# Patient Record
Sex: MALE | Race: WHITE | NOT HISPANIC OR LATINO | Employment: FULL TIME | URBAN - METROPOLITAN AREA
[De-identification: names, ages, dates, MRNs, and addresses within clinical notes are randomized per-mention and may not be internally consistent; named-entity substitution may affect disease eponyms.]

---

## 2018-09-04 ENCOUNTER — OFFICE VISIT (OUTPATIENT)
Dept: URGENT CARE | Facility: CLINIC | Age: 33
End: 2018-09-04

## 2018-09-04 VITALS
HEART RATE: 67 BPM | WEIGHT: 155 LBS | BODY MASS INDEX: 19.27 KG/M2 | RESPIRATION RATE: 16 BRPM | DIASTOLIC BLOOD PRESSURE: 72 MMHG | SYSTOLIC BLOOD PRESSURE: 133 MMHG | OXYGEN SATURATION: 98 % | HEIGHT: 75 IN | TEMPERATURE: 99 F

## 2018-09-04 DIAGNOSIS — Z02.89 ENCOUNTER FOR PHYSICAL EXAMINATION RELATED TO EMPLOYMENT: Primary | ICD-10-CM

## 2018-09-04 PROCEDURE — 99499 UNLISTED E&M SERVICE: CPT | Mod: S$GLB,,, | Performed by: FAMILY MEDICINE

## 2018-09-04 NOTE — PROGRESS NOTES
"Subjective:       Patient ID: Massimo Chanel is a 33 y.o. male.    Vitals:  height is 6' 3" (1.905 m) and weight is 70.3 kg (155 lb). His temperature is 99.3 °F (37.4 °C). His blood pressure is 133/72 and his pulse is 67. His respiration is 16 and oxygen saturation is 98%.     Chief Complaint: Employment Physical (pedicab)    Patient is here for a pedicab physical, does not wear any corrective lens, Patient states he could use a tetnus shot       Review of Systems   Constitution: Negative for chills and fever.   HENT: Negative for sore throat.    Eyes: Negative for blurred vision.   Cardiovascular: Negative for chest pain.   Respiratory: Negative for shortness of breath.    Skin: Negative for rash.   Musculoskeletal: Negative for back pain and joint pain.   Gastrointestinal: Negative for abdominal pain, diarrhea, nausea and vomiting.   Neurological: Negative for headaches.   Psychiatric/Behavioral: The patient is not nervous/anxious.    All other systems reviewed and are negative.      Objective:      Physical Exam   Constitutional: He is oriented to person, place, and time. He appears well-developed and well-nourished. He is cooperative.  Non-toxic appearance. He does not appear ill. No distress.   HENT:   Head: Normocephalic and atraumatic.   Right Ear: Hearing, tympanic membrane, external ear and ear canal normal.   Left Ear: Hearing, tympanic membrane, external ear and ear canal normal.   Nose: Nose normal. No mucosal edema, rhinorrhea or nasal deformity. No epistaxis. Right sinus exhibits no maxillary sinus tenderness and no frontal sinus tenderness. Left sinus exhibits no maxillary sinus tenderness and no frontal sinus tenderness.   Mouth/Throat: Uvula is midline, oropharynx is clear and moist and mucous membranes are normal. No trismus in the jaw. Normal dentition. No uvula swelling. No posterior oropharyngeal erythema.   Eyes: Conjunctivae and lids are normal. Right eye exhibits no discharge. Left " eye exhibits no discharge. No scleral icterus.   Sclera clear bilat   Neck: Trachea normal, normal range of motion, full passive range of motion without pain and phonation normal. Neck supple.   Cardiovascular: Normal rate, regular rhythm, normal heart sounds, intact distal pulses and normal pulses.   Pulmonary/Chest: Effort normal and breath sounds normal. No respiratory distress.   Abdominal: Soft. Normal appearance and bowel sounds are normal. He exhibits no distension, no pulsatile midline mass and no mass. There is no tenderness.   Musculoskeletal: Normal range of motion. He exhibits no edema or deformity.   Neurological: He is alert and oriented to person, place, and time. He exhibits normal muscle tone. Coordination normal.   Skin: Skin is warm, dry and intact. He is not diaphoretic. No pallor.   Psychiatric: He has a normal mood and affect. His speech is normal and behavior is normal. Judgment and thought content normal. Cognition and memory are normal.   Nursing note and vitals reviewed.      Assessment:       1. Encounter for physical examination related to employment        Plan:         Encounter for physical examination related to employment       F/U as needed  Clear for work

## 2018-09-28 ENCOUNTER — OFFICE VISIT (OUTPATIENT)
Dept: URGENT CARE | Facility: CLINIC | Age: 33
End: 2018-09-28

## 2018-09-28 VITALS
OXYGEN SATURATION: 99 % | HEIGHT: 75 IN | WEIGHT: 155 LBS | TEMPERATURE: 98 F | DIASTOLIC BLOOD PRESSURE: 62 MMHG | SYSTOLIC BLOOD PRESSURE: 111 MMHG | RESPIRATION RATE: 18 BRPM | BODY MASS INDEX: 19.27 KG/M2 | HEART RATE: 60 BPM

## 2018-09-28 DIAGNOSIS — M54.41 ACUTE RIGHT-SIDED LOW BACK PAIN WITH RIGHT-SIDED SCIATICA: Primary | ICD-10-CM

## 2018-09-28 PROCEDURE — 99214 OFFICE O/P EST MOD 30 MIN: CPT | Mod: 25,S$GLB,, | Performed by: NURSE PRACTITIONER

## 2018-09-28 PROCEDURE — 96372 THER/PROPH/DIAG INJ SC/IM: CPT | Mod: S$GLB,,, | Performed by: NURSE PRACTITIONER

## 2018-09-28 RX ORDER — METHYLPREDNISOLONE 4 MG/1
TABLET ORAL
Qty: 1 PACKAGE | Refills: 0 | Status: SHIPPED | OUTPATIENT
Start: 2018-09-28 | End: 2022-06-15

## 2018-09-28 RX ORDER — KETOROLAC TROMETHAMINE 30 MG/ML
30 INJECTION, SOLUTION INTRAMUSCULAR; INTRAVENOUS
Status: COMPLETED | OUTPATIENT
Start: 2018-09-28 | End: 2018-09-28

## 2018-09-28 RX ORDER — BETAMETHASONE SODIUM PHOSPHATE AND BETAMETHASONE ACETATE 3; 3 MG/ML; MG/ML
9 INJECTION, SUSPENSION INTRA-ARTICULAR; INTRALESIONAL; INTRAMUSCULAR; SOFT TISSUE
Status: COMPLETED | OUTPATIENT
Start: 2018-09-28 | End: 2018-09-28

## 2018-09-28 RX ORDER — METHOCARBAMOL 750 MG/1
750 TABLET, FILM COATED ORAL 4 TIMES DAILY PRN
Qty: 40 TABLET | Refills: 0 | Status: SHIPPED | OUTPATIENT
Start: 2018-09-28 | End: 2018-10-08

## 2018-09-28 RX ADMIN — BETAMETHASONE SODIUM PHOSPHATE AND BETAMETHASONE ACETATE 9 MG: 3; 3 INJECTION, SUSPENSION INTRA-ARTICULAR; INTRALESIONAL; INTRAMUSCULAR; SOFT TISSUE at 05:09

## 2018-09-28 RX ADMIN — KETOROLAC TROMETHAMINE 30 MG: 30 INJECTION, SOLUTION INTRAMUSCULAR; INTRAVENOUS at 05:09

## 2018-09-28 NOTE — PROGRESS NOTES
"Subjective:       Patient ID: Massimo Chanel is a 33 y.o. male.    Vitals:  height is 6' 3" (1.905 m) and weight is 70.3 kg (155 lb). His oral temperature is 97.5 °F (36.4 °C). His blood pressure is 111/62 and his pulse is 60. His respiration is 18 and oxygen saturation is 99%.     Chief Complaint: Back Pain    Patient states he been having back pain for 2 weeks. Patient states he was moving furniture today and his lower back began to hurt.  Right sided back pain radiating down right leg . Denies previous back injury.   Denies loss of bowel/bladder control. Denies .   Denies numbness or tingling to legs.         Back Pain   This is a new problem. The current episode started 1 to 4 weeks ago. The problem occurs constantly. The problem has been gradually worsening since onset. Pain location: lower back. The quality of the pain is described as shooting and stabbing. The pain does not radiate. The pain is at a severity of 8/10. The pain is severe. The symptoms are aggravated by standing and sitting. Pertinent negatives include no abdominal pain, bladder incontinence, bowel incontinence, dysuria or numbness. He has tried NSAIDs for the symptoms. The treatment provided no relief.     Review of Systems   Constitution: Negative for malaise/fatigue.   Skin: Negative for rash.   Musculoskeletal: Positive for back pain, muscle cramps, muscle weakness and stiffness.   Gastrointestinal: Negative for abdominal pain and bowel incontinence.   Genitourinary: Negative for bladder incontinence, dysuria, hematuria and urgency.   Neurological: Negative for disturbances in coordination and numbness.       Objective:      Physical Exam   Constitutional: He is oriented to person, place, and time. Vital signs are normal. He appears well-developed and well-nourished. He is active and cooperative.  Non-toxic appearance. He does not have a sickly appearance. He does not appear ill. No distress.   HENT:   Head: Normocephalic and " atraumatic.   Nose: Nose normal.   Mouth/Throat: Oropharynx is clear and moist and mucous membranes are normal.   Eyes: Conjunctivae, EOM and lids are normal. Pupils are equal, round, and reactive to light.   Neck: Trachea normal, normal range of motion, full passive range of motion without pain and phonation normal. Neck supple.   Cardiovascular: Normal rate, regular rhythm, normal heart sounds, intact distal pulses and normal pulses.   Pulmonary/Chest: Effort normal and breath sounds normal.   Abdominal: Soft. Normal appearance and bowel sounds are normal. He exhibits no abdominal bruit, no pulsatile midline mass and no mass. There is no tenderness. There is no rigidity, no rebound, no guarding, no CVA tenderness, no tenderness at McBurney's point and negative Lin's sign.   Musculoskeletal: He exhibits no edema or deformity.        Lumbar back: He exhibits decreased range of motion, tenderness, pain and spasm. He exhibits no bony tenderness, no swelling, no edema, no deformity, no laceration and normal pulse.        Back:    Paraspinous muscles tender to palpation  + straight leg raise to right leg.   No spinal tenderness    Neurological: He is alert and oriented to person, place, and time. He has normal strength and normal reflexes. He displays no atrophy and no tremor. No cranial nerve deficit or sensory deficit. He exhibits normal muscle tone. He displays no seizure activity. Gait (antalgic ) abnormal. Coordination normal.   NV intact    Skin: Skin is warm, dry and intact. No rash noted. He is not diaphoretic.   Psychiatric: He has a normal mood and affect. His speech is normal and behavior is normal. Judgment and thought content normal. Cognition and memory are normal.   Nursing note and vitals reviewed.      Assessment:       1. Acute right-sided low back pain with right-sided sciatica        Plan:         Acute right-sided low back pain with right-sided sciatica  -     betamethasone acetate-betamethasone  sodium phosphate injection 9 mg; Inject 1.5 mLs (9 mg total) into the muscle one time.  -     ketorolac injection 30 mg; Inject 1 mL (30 mg total) into the muscle one time.  -     methylPREDNISolone (MEDROL, DON,) 4 mg tablet; use as directed  Dispense: 1 Package; Refill: 0  -     methocarbamol (ROBAXIN) 750 MG Tab; Take 1 tablet (750 mg total) by mouth 4 (four) times daily as needed.  Dispense: 40 tablet; Refill: 0      Patient Instructions   Aleve 2 tablets 2 times per day with food.   Robaxin is a muscle relaxant- may cause sedation do not take and drive.   Medrol dose don to start tomorrow.     Please drink plenty of fluids.  Please get plenty of rest.  Please return here or go to the Emergency Department for any concerns or worsening of condition.  If you were prescribed a narcotic medication, do not drive or operate heavy equipment or machinery while taking these medications.  If you were not prescribed an anti-inflammatory medication, and if you do not have any history of stomach/intestinal ulcers, or kidney disease, or are not taking a blood thinner such as Coumadin, Plavix, Pradaxa, Eloquis, or Xaralta for example, it is OK to take over the counter Ibuprofen or Advil or Motrin or Aleve as directed.  Do not take these medications on an empty stomach.  If you lose control of your bowel and/or bladder, please go to the nearest Emergency Department immediately.  If you lose sensation in between your legs by your genitalia and/or rectum, please go to the nearest Emergency Department immediately.  If you lose control or sensation of any extremity, please go to the nearest Emergency Department immediately.  Please follow up with your primary care doctor or specialist as needed.    If you  smoke, please stop smoking.        Back Pain (Acute or Chronic)    Back pain is one of the most common problems. The good news is that most people feel better in 1 to 2 weeks, and most of the rest in 1 to 2 months. Most people  can remain active.  People experience and describe pain differently; not everyone is the same.  · The pain can be sharp, stabbing, shooting, aching, cramping or burning.  · Movement, standing, bending, lifting, sitting, or walking may worsen pain.  · It can be localized to one spot or area, or it can be more generalized.  · It can spread or radiate upwards, to the front, or go down your arms or legs (sciatica).  · It can cause muscle spasm.  Most of the time, mechanical problems with the muscles or spine cause the pain. Mechanical problems are usually caused by an injury to the muscles or ligaments. While illness can cause back pain, it is usually not caused by a serious illness. Mechanical problems include:   · Physical activity such as sports, exercise, work, or normal activity  · Overexertion, lifting, pushing, pulling incorrectly or too aggressively  · Sudden twisting, bending, or stretching from an accident, or accidental movement  · Poor posture  · Stretching or moving wrong, without noticing pain at the time  · Poor coordination, lack of regular exercise (check with your doctor about this)  · Spinal disc disease or arthritis  · Stress  Pain can also be related to pregnancy, or illness like appendicitis, bladder or kidney infections, pelvic infections, and many other things.  Acute back pain usually gets better in 1 to 2 weeks. Back pain related to disk disease, arthritis in the spinal joints or spinal stenosis (narrowing of the spinal canal) can become chronic and last for months or years.  Unless you had a physical injury (for example, a car accident or fall) X-rays are usually not needed for the initial evaluation of back pain. If pain continues and does not respond to medical treatment, X-rays and other tests may be needed.  Home care  Try these home care recommendations:  · When in bed, try to find a position of comfort. A firm mattress is best. Try lying flat on your back with pillows under your knees.  You can also try lying on your side with your knees bent up towards your chest and a pillow between your knees.  · At first, do not try to stretch out the sore spots. If there is a strain, it is not like the good soreness you get after exercising without an injury. In this case, stretching may make it worse.  · Avoid prolong sitting, long car rides, or travel. This puts more stress on the lower back than standing or walking.  · During the first 24 to 72 hours after an acute injury or flare up of chronic back pain, apply an ice pack to the painful area for 20 minutes and then remove it for 20 minutes. Do this over a period of 60 to 90 minutes or several times a day. This will reduce swelling and pain. Wrap the ice pack in a thin towel or plastic to protect your skin.  · You can start with ice, then switch to heat. Heat (hot shower, hot bath, or heating pad) reduces pain and works well for muscle spasms. Heat can be applied to the painful area for 20 minutes then remove it for 20 minutes. Do this over a period of 60 to 90 minutes or several times a day. Do not sleep on a heating pad. It can lead to skin burns or tissue damage.  · You can alternate ice and heat therapy. Talk with your doctor about the best treatment for your back pain.  · Therapeutic massage can help relax the back muscles without stretching them.  · Be aware of safe lifting methods and do not lift anything without stretching first.  Medicines  Talk to your doctor before using medicine, especially if you have other medical problems or are taking other medicines.  · You may use over-the-counter medicine as directed on the bottle to control pain, unless another pain medicine was prescribed. If you have chronic conditions like diabetes, liver or kidney disease, stomach ulcers, or gastrointestinal bleeding, or are taking blood thinners, talk to your doctor before taking any medicine.  · Be careful if you are given a prescription medicines, narcotics, or  medicine for muscle spasms. They can cause drowsiness, affect your coordination, reflexes, and judgement. Do not drive or operate heavy machinery.  Follow-up care  Follow up with your healthcare provider, or as advised.   A radiologist will review any X-rays that were taken. Your provide will notify you of any new findings that may affect your care.  Call 911  Call emergency services if any of the following occur:  · Trouble breathing  · Confusion  · Very drowsy or trouble awakening  · Fainting or loss of consciousness  · Rapid or very slow heart rate  · Loss of bowel or bladder control  When to seek medical advice  Call your healthcare provider right away if any of these occur:   · Pain becomes worse or spreads to your legs  · Weakness or numbness in one or both legs  · Numbness in the groin or genital area  Date Last Reviewed: 7/1/2016  © 0825-2928 [employer]. [address] [place] [id number]. All rights reserved. This information is not intended as a substitute for professional medical care. Always follow your healthcare professional's instructions.

## 2018-09-28 NOTE — PATIENT INSTRUCTIONS
Aleve 2 tablets 2 times per day with food.   Robaxin is a muscle relaxant- may cause sedation do not take and drive.   Medrol dose [place] to start tomorrow.     Please drink plenty of fluids.  Please get plenty of rest.  Please return here or go to the Emergency Department for any concerns or worsening of condition.  If you were prescribed a narcotic medication, do not drive or operate heavy equipment or machinery while taking these medications.  If you were not prescribed an anti-inflammatory medication, and if you do not have any history of stomach/intestinal ulcers, or kidney disease, or are not taking a blood thinner such as Coumadin, Plavix, Pradaxa, Eloquis, or Xaralta for example, it is OK to take over the counter Ibuprofen or Advil or Motrin or Aleve as directed.  Do not take these medications on an empty stomach.  If you lose control of your bowel and/or bladder, please go to the nearest Emergency Department immediately.  If you lose sensation in between your legs by your genitalia and/or rectum, please go to the nearest Emergency Department immediately.  If you lose control or sensation of any extremity, please go to the nearest Emergency Department immediately.  Please follow up with your primary care doctor or specialist as needed.    If you  smoke, please stop smoking.        Back Pain (Acute or Chronic)    Back pain is one of the most common problems. The good news is that most people feel better in 1 to 2 weeks, and most of the rest in 1 to 2 months. Most people can remain active.  People experience and describe pain differently; not everyone is the same.  · The pain can be sharp, stabbing, shooting, aching, cramping or burning.  · Movement, standing, bending, lifting, sitting, or walking may worsen pain.  · It can be localized to one spot or area, or it can be more generalized.  · It can spread or radiate upwards, to the front, or go down your arms or legs (sciatica).  · It can cause muscle spasm.  Most  of the time, mechanical problems with the muscles or spine cause the pain. Mechanical problems are usually caused by an injury to the muscles or ligaments. While illness can cause back pain, it is usually not caused by a serious illness. Mechanical problems include:   · Physical activity such as sports, exercise, work, or normal activity  · Overexertion, lifting, pushing, pulling incorrectly or too aggressively  · Sudden twisting, bending, or stretching from an accident, or accidental movement  · Poor posture  · Stretching or moving wrong, without noticing pain at the time  · Poor coordination, lack of regular exercise (check with your doctor about this)  · Spinal disc disease or arthritis  · Stress  Pain can also be related to pregnancy, or illness like appendicitis, bladder or kidney infections, pelvic infections, and many other things.  Acute back pain usually gets better in 1 to 2 weeks. Back pain related to disk disease, arthritis in the spinal joints or spinal stenosis (narrowing of the spinal canal) can become chronic and last for months or years.  Unless you had a physical injury (for example, a car accident or fall) X-rays are usually not needed for the initial evaluation of back pain. If pain continues and does not respond to medical treatment, X-rays and other tests may be needed.  Home care  Try these home care recommendations:  · When in bed, try to find a position of comfort. A firm mattress is best. Try lying flat on your back with pillows under your knees. You can also try lying on your side with your knees bent up towards your chest and a pillow between your knees.  · At first, do not try to stretch out the sore spots. If there is a strain, it is not like the good soreness you get after exercising without an injury. In this case, stretching may make it worse.  · Avoid prolong sitting, long car rides, or travel. This puts more stress on the lower back than standing or walking.  · During the first 24  to 72 hours after an acute injury or flare up of chronic back pain, apply an ice pack to the painful area for 20 minutes and then remove it for 20 minutes. Do this over a period of 60 to 90 minutes or several times a day. This will reduce swelling and pain. Wrap the ice pack in a thin towel or plastic to protect your skin.  · You can start with ice, then switch to heat. Heat (hot shower, hot bath, or heating pad) reduces pain and works well for muscle spasms. Heat can be applied to the painful area for 20 minutes then remove it for 20 minutes. Do this over a period of 60 to 90 minutes or several times a day. Do not sleep on a heating pad. It can lead to skin burns or tissue damage.  · You can alternate ice and heat therapy. Talk with your doctor about the best treatment for your back pain.  · Therapeutic massage can help relax the back muscles without stretching them.  · Be aware of safe lifting methods and do not lift anything without stretching first.  Medicines  Talk to your doctor before using medicine, especially if you have other medical problems or are taking other medicines.  · You may use over-the-counter medicine as directed on the bottle to control pain, unless another pain medicine was prescribed. If you have chronic conditions like diabetes, liver or kidney disease, stomach ulcers, or gastrointestinal bleeding, or are taking blood thinners, talk to your doctor before taking any medicine.  · Be careful if you are given a prescription medicines, narcotics, or medicine for muscle spasms. They can cause drowsiness, affect your coordination, reflexes, and judgement. Do not drive or operate heavy machinery.  Follow-up care  Follow up with your healthcare provider, or as advised.   A radiologist will review any X-rays that were taken. Your provide will notify you of any new findings that may affect your care.  Call 911  Call emergency services if any of the following occur:  · Trouble  breathing  · Confusion  · Very drowsy or trouble awakening  · Fainting or loss of consciousness  · Rapid or very slow heart rate  · Loss of bowel or bladder control  When to seek medical advice  Call your healthcare provider right away if any of these occur:   · Pain becomes worse or spreads to your legs  · Weakness or numbness in one or both legs  · Numbness in the groin or genital area  Date Last Reviewed: 7/1/2016  © 6925-9709 [employer] [address] [place] [address], [place] [address]. All rights reserved. This information is not intended as a substitute for professional medical care. Always follow your healthcare professional's instructions.

## 2019-01-06 ENCOUNTER — OFFICE VISIT (OUTPATIENT)
Dept: URGENT CARE | Facility: CLINIC | Age: 34
End: 2019-01-06

## 2019-01-06 VITALS
HEART RATE: 82 BPM | BODY MASS INDEX: 19.89 KG/M2 | RESPIRATION RATE: 18 BRPM | TEMPERATURE: 97 F | WEIGHT: 160 LBS | DIASTOLIC BLOOD PRESSURE: 92 MMHG | OXYGEN SATURATION: 97 % | SYSTOLIC BLOOD PRESSURE: 140 MMHG | HEIGHT: 75 IN

## 2019-01-06 DIAGNOSIS — J40 BRONCHITIS: Primary | ICD-10-CM

## 2019-01-06 PROCEDURE — 71046 XR CHEST PA AND LATERAL: ICD-10-PCS | Mod: FY,S$GLB,, | Performed by: RADIOLOGY

## 2019-01-06 PROCEDURE — 71046 X-RAY EXAM CHEST 2 VIEWS: CPT | Mod: FY,S$GLB,, | Performed by: RADIOLOGY

## 2019-01-06 PROCEDURE — 99214 OFFICE O/P EST MOD 30 MIN: CPT | Mod: S$GLB,,, | Performed by: FAMILY MEDICINE

## 2019-01-06 PROCEDURE — 99214 PR OFFICE/OUTPT VISIT, EST, LEVL IV, 30-39 MIN: ICD-10-PCS | Mod: S$GLB,,, | Performed by: FAMILY MEDICINE

## 2019-01-06 PROCEDURE — 94640 AIRWAY INHALATION TREATMENT: CPT | Mod: S$GLB,,, | Performed by: EMERGENCY MEDICINE

## 2019-01-06 PROCEDURE — 94640 PR INHAL RX, AIRWAY OBST/DX SPUTUM INDUCT: ICD-10-PCS | Mod: S$GLB,,, | Performed by: EMERGENCY MEDICINE

## 2019-01-06 RX ORDER — ALBUTEROL SULFATE 90 UG/1
2 AEROSOL, METERED RESPIRATORY (INHALATION) EVERY 6 HOURS PRN
Qty: 1 INHALER | Refills: 0 | Status: SHIPPED | OUTPATIENT
Start: 2019-01-06

## 2019-01-06 RX ORDER — PROMETHAZINE HYDROCHLORIDE AND DEXTROMETHORPHAN HYDROBROMIDE 6.25; 15 MG/5ML; MG/5ML
5 SYRUP ORAL
Qty: 118 ML | Refills: 0 | Status: SHIPPED | OUTPATIENT
Start: 2019-01-06 | End: 2022-06-15

## 2019-01-06 RX ORDER — ALBUTEROL SULFATE 0.63 MG/3ML
0.63 SOLUTION RESPIRATORY (INHALATION) EVERY 6 HOURS PRN
COMMUNITY

## 2019-01-06 RX ORDER — ALBUTEROL SULFATE 0.83 MG/ML
2.5 SOLUTION RESPIRATORY (INHALATION)
Status: COMPLETED | OUTPATIENT
Start: 2019-01-06 | End: 2019-01-06

## 2019-01-06 RX ORDER — IPRATROPIUM BROMIDE 0.5 MG/2.5ML
0.5 SOLUTION RESPIRATORY (INHALATION)
Status: COMPLETED | OUTPATIENT
Start: 2019-01-06 | End: 2019-01-06

## 2019-01-06 RX ADMIN — ALBUTEROL SULFATE 2.5 MG: 0.83 SOLUTION RESPIRATORY (INHALATION) at 11:01

## 2019-01-06 RX ADMIN — IPRATROPIUM BROMIDE 0.5 MG: 0.5 SOLUTION RESPIRATORY (INHALATION) at 11:01

## 2019-01-06 NOTE — PATIENT INSTRUCTIONS
PLEASE READ YOUR DISCHARGE INSTRUCTIONS ENTIRELY AS IT CONTAINS IMPORTANT INFORMATION.        Use the albuterol inhaler for wheezing.     Do not drive while taking the cough syrup - best to take it at night before going to sleep. However, you can take it during the day (every 4-6 hours) if you do not have to drive or operate machinery. This medication will make you drowsy. Try taking half a dose first to see how it affects you.      Please go to the ER for worsening symptoms, consider follow up with [facility] or [facility]     Please arrange follow up with your primary medical clinic as soon as possible. You must understand that you've received an Urgent Care treatment only and that you may be released before all of your medical problems are known or treated. You, the patient, will arrange for follow up as instructed. If your symptoms worsen or fail to improve you should go to the Emergency Room.    Bronchitis with Wheezing (Viral or Bacterial: Adult)    Bronchitis is an infection of the air passages. It often occurs during a cold and is usually caused by a virus. Symptoms include cough with mucus (phlegm) and low-grade fever. This illness is contagious during the first few days and is spread through the air by coughing and sneezing, or by direct contact (touching the sick person and then touching your own eyes, nose, or mouth).  If there is a lot of inflammation, air flow is restricted. The air passages may also go into spasm, especially if you have asthma. This causes wheezing and difficulty breathing even in people who do not have asthma.  Bronchitis usually lasts 7 to 14 days. The wheezing should improve with treatment during the first week. An inhaler is often prescribed to relax the air passages and stop wheezing. Antibiotics will be prescribed if your doctor thinks there is also a secondary bacterial infection.  Home care  · If symptoms are severe, rest at home for the first 2 to 3 days. When  you go back to your usual activities, don't let yourself get too tired.  · Do not smoke. Also avoid being exposed to secondhand smoke.  · You may use over-the-counter medicine to control fever or pain, unless another medicine was prescribed. Note: If you have chronic liver or kidney disease or have ever had a stomach ulcer or gastrointestinal bleeding, talk with your healthcare provider before using these medicines. Also talk to your provider if you are taking medicine to prevent blood clots.) Aspirin should never be given to anyone younger than 18 years of age who is ill with a viral infection or fever. It may cause severe liver or brain damage.  · Your appetite may be poor, so a light diet is fine. Avoid dehydration by drinking 6 to 8 glasses of fluids per day (such as water, soft drinks, sports drinks, juices, tea, or soup). Extra fluids will help loosen secretions in the nose and lungs.  · Over-the-counter cough, cold, and sore-throat medicines will not shorten the length of the illness, but they may be helpful to reduce symptoms. (Note: Do not use decongestants if you have high blood pressure.)  · If you were given an inhaler, use it exactly as directed. If you need to use it more often than prescribed, your condition may be worsening. If this happens, contact your healthcare provider.  · If prescribed, finish all antibiotic medicine, even if you are feeling better after only a few days.  Follow-up care  Follow up with your healthcare provider, or as advised. If you had an X-ray or ECG (electrocardiogram), a specialist will review it. You will be notified of any new findings that may affect your care.  Note: If you are age 65 or older, or if you have a chronic lung disease or condition that affects your immune system, or you smoke, talk to your healthcare provider about having a pneumococcal vaccinations and a yearly influenza vaccination (flu shot).  When to seek medical advice  Call your healthcare provider  right away if any of these occur:  · Fever of 100.4°F (38°C) or higher  · Coughing up increasing amounts of colored sputum  · Weakness, drowsiness, headache, facial pain, ear pain, or a stiff neck  Call 911, or get immediate medical care  Contact emergency services right away if any of these occur.  · Coughing up blood  · Worsening weakness, drowsiness, headache, or stiff neck  · Increased wheezing not helped with medication, shortness of breath, or pain with breathing  Date Last Reviewed: 9/13/2015  © 0776-5160 [employer]. [address] [place] [address]. All rights reserved. This information is not intended as a substitute for professional medical care. Always follow your healthcare professional's instructions.

## 2019-01-06 NOTE — PROGRESS NOTES
"Subjective:       Patient ID: Massimo Chanel is a 33 y.o. male.    Vitals:  height is 6' 3" (1.905 m) and weight is 72.6 kg (160 lb). His oral temperature is 97.1 °F (36.2 °C). His blood pressure is 140/92 (abnormal) and his pulse is 82. His respiration is 18 and oxygen saturation is 97%.     Chief Complaint: URI    Pt reports shortness of breath, wheezing, cough for three weeks. Pt states he went to the emergency room at [facility] on Monday bc he was unable to breath. Pt states he received a chest xray, flu swab. Pt reports bloody tinged sputum starting last night. Pt states the albuterol is no longer helping. He is also prescribed 60mg prednisone for 5 days. Sx worsened again last night      URI    This is a new problem. The current episode started 1 to 4 weeks ago. The problem has been gradually worsening. There has been no fever. Associated symptoms include chest pain (substernal), congestion, coughing and wheezing. Pertinent negatives include no ear pain, nausea, rash, sinus pain, sore throat or vomiting. He has tried inhaler use for the symptoms. The treatment provided no relief.       Constitution: Negative for chills, sweating, fatigue and fever.   HENT: Positive for congestion. Negative for ear pain, sinus pain, sinus pressure, sore throat and voice change.    Neck: Negative for painful lymph nodes.   Cardiovascular: Positive for chest pain (substernal).   Eyes: Negative for eye redness.   Respiratory: Positive for cough, sputum production, bloody sputum, shortness of breath and wheezing. Negative for chest tightness, COPD, stridor and asthma.    Gastrointestinal: Negative for nausea and vomiting.   Musculoskeletal: Negative for muscle ache.   Skin: Negative for rash.   Allergic/Immunologic: Negative for seasonal allergies and asthma.   Hematologic/Lymphatic: Negative for swollen lymph nodes.       Objective:      Physical Exam   Constitutional: He is oriented to person, place, and time. He appears " well-developed and well-nourished. He is cooperative.  Non-toxic appearance. He does not appear ill. No distress.   HENT:   Head: Normocephalic and atraumatic.   Right Ear: Hearing, tympanic membrane, external ear and ear canal normal.   Left Ear: Hearing, tympanic membrane, external ear and ear canal normal.   Nose: Nose normal. No mucosal edema, rhinorrhea or nasal deformity. No epistaxis. Right sinus exhibits no maxillary sinus tenderness and no frontal sinus tenderness. Left sinus exhibits no maxillary sinus tenderness and no frontal sinus tenderness.   Mouth/Throat: Uvula is midline, oropharynx is clear and moist and mucous membranes are normal. No trismus in the jaw. Normal dentition. No uvula swelling. No posterior oropharyngeal erythema.   Eyes: Conjunctivae and lids are normal. No scleral icterus.   Sclera clear bilat   Neck: Trachea normal, full passive range of motion without pain and phonation normal. Neck supple.   Cardiovascular: Normal rate, regular rhythm, normal heart sounds, intact distal pulses and normal pulses.   Pulmonary/Chest: Effort normal. No respiratory distress. He has wheezes. He exhibits no tenderness and no crepitus.   Post treatment assessment: subjective improvement in symptoms - improvement in wheezing     Abdominal: Soft. Normal appearance and bowel sounds are normal. He exhibits no distension. There is no tenderness.   Musculoskeletal: Normal range of motion. He exhibits no edema or deformity.   Neurological: He is alert and oriented to person, place, and time. He exhibits normal muscle tone. Coordination normal.   Skin: Skin is warm, dry and intact. He is not diaphoretic. No pallor.   Psychiatric: He has a normal mood and affect. His speech is normal and behavior is normal. Judgment and thought content normal. Cognition and memory are normal.   Nursing note and vitals reviewed.    X-ray Chest Pa And Lateral    Result Date: 1/6/2019  EXAMINATION: XR CHEST PA AND LATERAL CLINICAL  HISTORY: Wheezing TECHNIQUE: PA and lateral views of the chest were performed. COMPARISON: None FINDINGS: Lungs are well expanded and clear without focal pulmonary consolidation.  There is no pleural effusion.  Cardiomediastinal silhouette is not enlarged.  Dextroscoliosis of the thoracic spine is seen.     No acute intrathoracic disease. Electronically signed by: Yung Stevens MD Date:    01/06/2019 Time:    11:44    Assessment:       1. Bronchitis        Plan:         Bronchitis  -     X-Ray Chest PA And Lateral; Future; Expected date: 01/06/2019  -     albuterol nebulizer solution 2.5 mg  -     ipratropium 0.02 % nebulizer solution 0.5 mg  -     albuterol (PROVENTIL/VENTOLIN HFA) 90 mcg/actuation inhaler; Inhale 2 puffs into the lungs every 6 (six) hours as needed for Wheezing. Rescue  Dispense: 1 Inhaler; Refill: 0  -     promethazine-dextromethorphan (PROMETHAZINE-DM) 6.25-15 mg/5 mL Syrp; Take 5 mLs by mouth every 4 to 6 hours as needed. 5 mL every 4 to 6 hours; maximum: 30 mL in 24 hours  Dispense: 118 mL; Refill: 0      Patient Instructions   PLEASE READ YOUR DISCHARGE INSTRUCTIONS ENTIRELY AS IT CONTAINS IMPORTANT INFORMATION.        Use the albuterol inhaler for wheezing.     Do not drive while taking the cough syrup - best to take it at night before going to sleep. However, you can take it during the day (every 4-6 hours) if you do not have to drive or operate machinery. This medication will make you drowsy. Try taking half a dose first to see how it affects you.      Please go to the ER for worsening symptoms, consider follow up with [facility] or [facility]     Please arrange follow up with your primary medical clinic as soon as possible. You must understand that you've received an Urgent Care treatment only and that you may be released before all of your medical problems are known or treated. You, the patient, will arrange for follow up as instructed. If your symptoms worsen or fail to  improve you should go to the Emergency Room.    Bronchitis with Wheezing (Viral or Bacterial: Adult)    Bronchitis is an infection of the air passages. It often occurs during a cold and is usually caused by a virus. Symptoms include cough with mucus (phlegm) and low-grade fever. This illness is contagious during the first few days and is spread through the air by coughing and sneezing, or by direct contact (touching the sick person and then touching your own eyes, nose, or mouth).  If there is a lot of inflammation, air flow is restricted. The air passages may also go into spasm, especially if you have asthma. This causes wheezing and difficulty breathing even in people who do not have asthma.  Bronchitis usually lasts 7 to 14 days. The wheezing should improve with treatment during the first week. An inhaler is often prescribed to relax the air passages and stop wheezing. Antibiotics will be prescribed if your doctor thinks there is also a secondary bacterial infection.  Home care  · If symptoms are severe, rest at home for the first 2 to 3 days. When you go back to your usual activities, don't let yourself get too tired.  · Do not smoke. Also avoid being exposed to secondhand smoke.  · You may use over-the-counter medicine to control fever or pain, unless another medicine was prescribed. Note: If you have chronic liver or kidney disease or have ever had a stomach ulcer or gastrointestinal bleeding, talk with your healthcare provider before using these medicines. Also talk to your provider if you are taking medicine to prevent blood clots.) Aspirin should never be given to anyone younger than 18 years of age who is ill with a viral infection or fever. It may cause severe liver or brain damage.  · Your appetite may be poor, so a light diet is fine. Avoid dehydration by drinking 6 to 8 glasses of fluids per day (such as water, soft drinks, sports drinks, juices, tea, or soup). Extra fluids will help loosen secretions  in the nose and lungs.  · Over-the-counter cough, cold, and sore-throat medicines will not shorten the length of the illness, but they may be helpful to reduce symptoms. (Note: Do not use decongestants if you have high blood pressure.)  · If you were given an inhaler, use it exactly as directed. If you need to use it more often than prescribed, your condition may be worsening. If this happens, contact your healthcare provider.  · If prescribed, finish all antibiotic medicine, even if you are feeling better after only a few days.  Follow-up care  Follow up with your healthcare provider, or as advised. If you had an X-ray or ECG (electrocardiogram), a specialist will review it. You will be notified of any new findings that may affect your care.  Note: If you are age 65 or older, or if you have a chronic lung disease or condition that affects your immune system, or you smoke, talk to your healthcare provider about having a pneumococcal vaccinations and a yearly influenza vaccination (flu shot).  When to seek medical advice  Call your healthcare provider right away if any of these occur:  · Fever of 100.4°F (38°C) or higher  · Coughing up increasing amounts of colored sputum  · Weakness, drowsiness, headache, facial pain, ear pain, or a stiff neck  Call 911, or get immediate medical care  Contact emergency services right away if any of these occur.  · Coughing up blood  · Worsening weakness, drowsiness, headache, or stiff neck  · Increased wheezing not helped with medication, shortness of breath, or pain with breathing  Date Last Reviewed: 9/13/2015  © 5023-4499 [employer] [address] [place] [address], [place] [address]. All rights reserved. This information is not intended as a substitute for professional medical care. Always follow your healthcare professional's instructions.

## 2020-09-19 ENCOUNTER — OFFICE VISIT (OUTPATIENT)
Dept: URGENT CARE | Facility: CLINIC | Age: 35
End: 2020-09-19
Payer: MEDICAID

## 2020-09-19 VITALS
HEIGHT: 75 IN | DIASTOLIC BLOOD PRESSURE: 61 MMHG | WEIGHT: 160 LBS | TEMPERATURE: 98 F | OXYGEN SATURATION: 99 % | RESPIRATION RATE: 18 BRPM | HEART RATE: 61 BPM | BODY MASS INDEX: 19.89 KG/M2 | SYSTOLIC BLOOD PRESSURE: 114 MMHG

## 2020-09-19 DIAGNOSIS — R50.9 FEVER, UNSPECIFIED FEVER CAUSE: Primary | ICD-10-CM

## 2020-09-19 DIAGNOSIS — J06.9 ACUTE URI: ICD-10-CM

## 2020-09-19 LAB
CTP QC/QA: YES
SARS-COV-2 RDRP RESP QL NAA+PROBE: NEGATIVE

## 2020-09-19 PROCEDURE — U0002 COVID-19 LAB TEST NON-CDC: HCPCS | Mod: QW,S$GLB,, | Performed by: FAMILY MEDICINE

## 2020-09-19 PROCEDURE — U0002: ICD-10-PCS | Mod: QW,S$GLB,, | Performed by: FAMILY MEDICINE

## 2020-09-19 PROCEDURE — 99214 OFFICE O/P EST MOD 30 MIN: CPT | Mod: S$GLB,,, | Performed by: FAMILY MEDICINE

## 2020-09-19 PROCEDURE — 99214 PR OFFICE/OUTPT VISIT, EST, LEVL IV, 30-39 MIN: ICD-10-PCS | Mod: S$GLB,,, | Performed by: FAMILY MEDICINE

## 2020-09-19 RX ORDER — DUPILUMAB 300 MG/2ML
INJECTION, SOLUTION SUBCUTANEOUS
COMMUNITY
Start: 2020-08-07

## 2020-09-19 RX ORDER — BUDESONIDE 0.25 MG/2ML
INHALANT ORAL
COMMUNITY
Start: 2020-06-08

## 2020-09-19 RX ORDER — FLUTICASONE FUROATE AND VILANTEROL TRIFENATATE 200; 25 UG/1; UG/1
POWDER RESPIRATORY (INHALATION)
COMMUNITY
Start: 2020-09-14

## 2020-09-19 NOTE — PROGRESS NOTES
"Subjective:       Patient ID: Massimo Chanel is a 35 y.o. male.    Vitals:  height is 6' 3" (1.905 m) and weight is 72.6 kg (160 lb). His temporal temperature is 97.7 °F (36.5 °C). His blood pressure is 114/61 and his pulse is 61. His respiration is 18 and oxygen saturation is 99%.     Chief Complaint: COVID-19 Concerns (fever for 5 days )    Patient got ppv23 vaccine done on Monday 09/14/2020. Reports since the night of his pneumonia shot 5 days ago he has been having fever and body aches. Pt has hx of asthma. Reports coughing and mild wheezing which he states are usual for him with asthma. Also reports mild nausea. Denies vomiting, diarrhea, ab pain. Denies CP, SOB. Reports the symptoms have eased off today he is feeling better now.     Fever   This is a new problem. The current episode started in the past 7 days (09/14/2020). The problem occurs intermittently. The problem has been unchanged. The temperature was taken using an axillary reading (102.0). Associated symptoms include diarrhea. Pertinent negatives include no urinary pain. He has tried NSAIDs and acetaminophen for the symptoms. The treatment provided significant relief.       Constitution: Positive for fever.   Neck: Negative for painful lymph nodes.   Cardiovascular: Negative for leg swelling.   Eyes: Negative for double vision and blurred vision.   Respiratory: Positive for asthma. Negative for shortness of breath.    Gastrointestinal: Positive for diarrhea.   Genitourinary: Negative for dysuria, frequency and urgency.   Musculoskeletal: Negative for muscle cramps.   Skin: Negative for color change and pale.   Allergic/Immunologic: Positive for asthma. Negative for seasonal allergies.   Neurological: Negative for dizziness, light-headedness and passing out.   Hematologic/Lymphatic: Negative for swollen lymph nodes, easy bruising/bleeding and history of blood clots. Does not bruise/bleed easily.   Psychiatric/Behavioral: Negative for " nervous/anxious, sleep disturbance and depression. The patient is not nervous/anxious.        Objective:      Physical Exam   Constitutional: He is oriented to person, place, and time. He appears well-developed. He is cooperative.  Non-toxic appearance. He does not appear ill. No distress.   HENT:   Head: Normocephalic and atraumatic.   Ears:   Right Ear: Hearing, tympanic membrane, external ear and ear canal normal. impacted cerumen  Left Ear: Hearing, tympanic membrane, external ear and ear canal normal. impacted cerumen  Nose: Nose normal. No mucosal edema, rhinorrhea, nasal deformity or congestion. No epistaxis. Right sinus exhibits no maxillary sinus tenderness and no frontal sinus tenderness. Left sinus exhibits no maxillary sinus tenderness and no frontal sinus tenderness.   Mouth/Throat: Uvula is midline, oropharynx is clear and moist and mucous membranes are normal. No trismus in the jaw. Normal dentition. No uvula swelling. No posterior oropharyngeal erythema.   Eyes: Conjunctivae and lids are normal. Right eye exhibits no discharge. Left eye exhibits no discharge. No scleral icterus.   Neck: Trachea normal, normal range of motion, full passive range of motion without pain and phonation normal. Neck supple. No neck rigidity.   Cardiovascular: Normal rate, regular rhythm, normal heart sounds and normal pulses.   No murmur heard.  Pulmonary/Chest: Effort normal and breath sounds normal. No stridor. No respiratory distress. He has no wheezes. He has no rhonchi. He has no rales. He exhibits no tenderness.   Abdominal: Soft. Normal appearance and bowel sounds are normal. He exhibits no distension, no pulsatile midline mass and no mass. There is no abdominal tenderness. There is left CVA tenderness. There is no rebound and no guarding.   Musculoskeletal: Normal range of motion.         General: No deformity.   Lymphadenopathy:     He has no cervical adenopathy.   Neurological: He is alert and oriented to person,  place, and time. He exhibits normal muscle tone. Coordination normal.   Skin: Skin is warm, dry, intact, not diaphoretic and not pale. Psychiatric: His speech is normal and behavior is normal. Judgment and thought content normal.   Nursing note and vitals reviewed.        Assessment:       1. Fever, unspecified fever cause    2. Acute URI        Plan:         Fever, unspecified fever cause  -     POCT COVID-19 Rapid Screening    Acute URI        PLEASE READ YOUR DISCHARGE INSTRUCTIONS ENTIRELY AS IT CONTAINS IMPORTANT INFORMATION.    Please return here or go to the Emergency Department for any concerns or worsening of condition.      If not allergic, please take over the counter Tylenol (Acetaminophen) and/or Motrin (Ibuprofen) as directed for control of pain and/or fever.  Please follow up with your primary care doctor or specialist as needed.      If you smoke, please stop smoking.    Please return or see your primary care doctor if you develop new or worsening symptoms.     Please arrange follow up with your primary medical clinic as soon as possible. You must understand that you've received an Urgent Care treatment only and that you may be released before all of your medical problems are known or treated. You, the patient, will arrange for follow up as instructed. If your symptoms worsen or fail to improve you should go to the Emergency Room.  Viral Upper Respiratory Illness (Adult)  You have a viral upper respiratory illness (URI), which is another term for the common cold. This illness is contagious during the first few days. It is spread through the air by coughing and sneezing. It may also be spread by direct contact (touching the sick person and then touching your own eyes, nose, or mouth). Frequent handwashing will decrease risk of spread. Most viral illnesses go away within 7 to 10 days with rest and simple home remedies. Sometimes the illness may last for several weeks. Antibiotics will not kill a virus,  and they are generally not prescribed for this condition.    Home care  · If symptoms are severe, rest at home for the first 2 to 3 days. When you resume activity, don't let yourself get too tired.  · Avoid being exposed to cigarette smoke (yours or others).  · You may use acetaminophen or ibuprofen to control pain and fever, unless another medicine was prescribed. (Note: If you have chronic liver or kidney disease, have ever had a stomach ulcer or gastrointestinal bleeding, or are taking blood-thinning medicines, talk with your healthcare provider before using these medicines.) Aspirin should never be given to anyone under 18 years of age who is ill with a viral infection or fever. It may cause severe liver or brain damage.  · Your appetite may be poor, so a light diet is fine. Avoid dehydration by drinking 6 to 8 glasses of fluids per day (water, soft drinks, juices, tea, or soup). Extra fluids will help loosen secretions in the nose and lungs.  · Over-the-counter cold medicines will not shorten the length of time youre sick, but they may be helpful for the following symptoms: cough, sore throat, and nasal and sinus congestion. (Note: Do not use decongestants if you have high blood pressure.)  Follow-up care  Follow up with your healthcare provider, or as advised.  When to seek medical advice  Call your healthcare provider right away if any of these occur:  · Cough with lots of colored sputum (mucus)  · Severe headache; face, neck, or ear pain  · Difficulty swallowing due to throat pain  · Fever of 100.4°F (38°C)  Call 911, or get immediate medical care  Call emergency services right away if any of these occur:  · Chest pain, shortness of breath, wheezing, or difficulty breathing  · Coughing up blood  · Inability to swallow due to throat pain  Date Last Reviewed: 9/13/2015  © 3131-5188 [employer]. [address] [place] [address], [place] [address]. All rights reserved. This information is not intended as a  substitute for professional medical care. Always follow your healthcare professional's instructions.      Guidelines for General Prevention of COVID-19    o Take steps to protect yourself from COVID-19. Perform hand hygiene frequently. Wash your hands often with soap and water for at least 20 seconds of use and alcohol-based hand , covering all surfaces of your hands and rubbing them together until they feel dry.  o Avoid touching your eyes, nose, and mouth with unwashed hands.  o Avoid close contact with people and stay home if youre sick, except to get medical care.   o Cover coughs and sneezes with a tissue, or use the inside of your elbow. Immediately wash your hands or use hand .     For more information, see CDC link below:    https://www.cdc.gov/coronavirus/2019-ncov/hcp/guidance-prevent-spread.html#precautions

## 2020-09-19 NOTE — PATIENT INSTRUCTIONS
PLEASE READ YOUR DISCHARGE INSTRUCTIONS ENTIRELY AS IT CONTAINS IMPORTANT INFORMATION.    Please return here or go to the Emergency Department for any concerns or worsening of condition.      If not allergic, please take over the counter Tylenol (Acetaminophen) and/or Motrin (Ibuprofen) as directed for control of pain and/or fever.  Please follow up with your primary care doctor or specialist as needed.      If you smoke, please stop smoking.    Please return or see your primary care doctor if you develop new or worsening symptoms.     Please arrange follow up with your primary medical clinic as soon as possible. You must understand that you've received an Urgent Care treatment only and that you may be released before all of your medical problems are known or treated. You, the patient, will arrange for follow up as instructed. If your symptoms worsen or fail to improve you should go to the Emergency Room.  Viral Upper Respiratory Illness (Adult)  You have a viral upper respiratory illness (URI), which is another term for the common cold. This illness is contagious during the first few days. It is spread through the air by coughing and sneezing. It may also be spread by direct contact (touching the sick person and then touching your own eyes, nose, or mouth). Frequent handwashing will decrease risk of spread. Most viral illnesses go away within 7 to 10 days with rest and simple home remedies. Sometimes the illness may last for several weeks. Antibiotics will not kill a virus, and they are generally not prescribed for this condition.    Home care  · If symptoms are severe, rest at home for the first 2 to 3 days. When you resume activity, don't let yourself get too tired.  · Avoid being exposed to cigarette smoke (yours or others).  · You may use acetaminophen or ibuprofen to control pain and fever, unless another medicine was prescribed. (Note: If you have chronic liver or kidney disease, have ever had a stomach  ulcer or gastrointestinal bleeding, or are taking blood-thinning medicines, talk with your healthcare provider before using these medicines.) Aspirin should never be given to anyone under 18 years of age who is ill with a viral infection or fever. It may cause severe liver or brain damage.  · Your appetite may be poor, so a light diet is fine. Avoid dehydration by drinking 6 to 8 glasses of fluids per day (water, soft drinks, juices, tea, or soup). Extra fluids will help loosen secretions in the nose and lungs.  · Over-the-counter cold medicines will not shorten the length of time youre sick, but they may be helpful for the following symptoms: cough, sore throat, and nasal and sinus congestion. (Note: Do not use decongestants if you have high blood pressure.)  Follow-up care  Follow up with your healthcare provider, or as advised.  When to seek medical advice  Call your healthcare provider right away if any of these occur:  · Cough with lots of colored sputum (mucus)  · Severe headache; face, neck, or ear pain  · Difficulty swallowing due to throat pain  · Fever of 100.4°F (38°C)  Call 911, or get immediate medical care  Call emergency services right away if any of these occur:  · Chest pain, shortness of breath, wheezing, or difficulty breathing  · Coughing up blood  · Inability to swallow due to throat pain  Date Last Reviewed: 9/13/2015 © 2000-2017 [employer]. [address] [place] [address]. All rights reserved. This information is not intended as a substitute for professional medical care. Always follow your healthcare professional's instructions.      Guidelines for General Prevention of COVID-19    o Take steps to protect yourself from COVID-19. Perform hand hygiene frequently. Wash your hands often with soap and water for at least 20 seconds of use and alcohol-based hand , covering all surfaces of your hands and rubbing them together until they feel dry.  o Avoid touching  your eyes, nose, and mouth with unwashed hands.  o Avoid close contact with people and stay home if youre sick, except to get medical care.   o Cover coughs and sneezes with a tissue, or use the inside of your elbow. Immediately wash your hands or use hand .     For more information, see CDC link below:    https://www.cdc.gov/coronavirus/2019-ncov/hcp/guidance-prevent-spread.html#precautions

## 2021-03-25 ENCOUNTER — IMMUNIZATION (OUTPATIENT)
Dept: PRIMARY CARE CLINIC | Facility: CLINIC | Age: 36
End: 2021-03-25
Payer: MEDICAID

## 2021-03-25 DIAGNOSIS — Z23 NEED FOR VACCINATION: Primary | ICD-10-CM

## 2021-03-25 PROCEDURE — 0001A COVID-19, MRNA, LNP-S, PF, 30 MCG/0.3 ML DOSE VACCINE: ICD-10-PCS | Mod: CV19,S$GLB,, | Performed by: INTERNAL MEDICINE

## 2021-03-25 PROCEDURE — 0001A COVID-19, MRNA, LNP-S, PF, 30 MCG/0.3 ML DOSE VACCINE: CPT | Mod: CV19,S$GLB,, | Performed by: INTERNAL MEDICINE

## 2021-03-25 PROCEDURE — 91300 COVID-19, MRNA, LNP-S, PF, 30 MCG/0.3 ML DOSE VACCINE: ICD-10-PCS | Mod: S$GLB,,, | Performed by: INTERNAL MEDICINE

## 2021-03-25 PROCEDURE — 91300 COVID-19, MRNA, LNP-S, PF, 30 MCG/0.3 ML DOSE VACCINE: CPT | Mod: S$GLB,,, | Performed by: INTERNAL MEDICINE

## 2021-04-15 ENCOUNTER — IMMUNIZATION (OUTPATIENT)
Dept: PRIMARY CARE CLINIC | Facility: CLINIC | Age: 36
End: 2021-04-15

## 2021-04-15 DIAGNOSIS — Z23 NEED FOR VACCINATION: Primary | ICD-10-CM

## 2021-04-15 PROCEDURE — 91300 COVID-19, MRNA, LNP-S, PF, 30 MCG/0.3 ML DOSE VACCINE: ICD-10-PCS | Mod: S$GLB,,, | Performed by: INTERNAL MEDICINE

## 2021-04-15 PROCEDURE — 0002A COVID-19, MRNA, LNP-S, PF, 30 MCG/0.3 ML DOSE VACCINE: CPT | Mod: CV19,S$GLB,, | Performed by: INTERNAL MEDICINE

## 2021-04-15 PROCEDURE — 91300 COVID-19, MRNA, LNP-S, PF, 30 MCG/0.3 ML DOSE VACCINE: CPT | Mod: S$GLB,,, | Performed by: INTERNAL MEDICINE

## 2021-04-15 PROCEDURE — 0002A COVID-19, MRNA, LNP-S, PF, 30 MCG/0.3 ML DOSE VACCINE: ICD-10-PCS | Mod: CV19,S$GLB,, | Performed by: INTERNAL MEDICINE

## 2022-05-31 ENCOUNTER — OFFICE VISIT (OUTPATIENT)
Dept: URGENT CARE | Facility: CLINIC | Age: 37
End: 2022-05-31
Payer: MEDICAID

## 2022-05-31 VITALS
DIASTOLIC BLOOD PRESSURE: 83 MMHG | BODY MASS INDEX: 19.89 KG/M2 | WEIGHT: 160 LBS | HEART RATE: 87 BPM | TEMPERATURE: 98 F | OXYGEN SATURATION: 96 % | SYSTOLIC BLOOD PRESSURE: 126 MMHG | RESPIRATION RATE: 16 BRPM | HEIGHT: 75 IN

## 2022-05-31 DIAGNOSIS — M25.512 LEFT SHOULDER PAIN, UNSPECIFIED CHRONICITY: Primary | ICD-10-CM

## 2022-05-31 DIAGNOSIS — M79.671 RIGHT FOOT PAIN: ICD-10-CM

## 2022-05-31 PROCEDURE — 73630 XR FOOT COMPLETE 3 VIEW RIGHT: ICD-10-PCS | Mod: FY,RT,S$GLB, | Performed by: RADIOLOGY

## 2022-05-31 PROCEDURE — 1160F RVW MEDS BY RX/DR IN RCRD: CPT | Mod: CPTII,S$GLB,, | Performed by: PHYSICIAN ASSISTANT

## 2022-05-31 PROCEDURE — 1160F PR REVIEW ALL MEDS BY PRESCRIBER/CLIN PHARMACIST DOCUMENTED: ICD-10-PCS | Mod: CPTII,S$GLB,, | Performed by: PHYSICIAN ASSISTANT

## 2022-05-31 PROCEDURE — 3008F BODY MASS INDEX DOCD: CPT | Mod: CPTII,S$GLB,, | Performed by: PHYSICIAN ASSISTANT

## 2022-05-31 PROCEDURE — 73030 X-RAY EXAM OF SHOULDER: CPT | Mod: FY,LT,S$GLB, | Performed by: RADIOLOGY

## 2022-05-31 PROCEDURE — 1159F PR MEDICATION LIST DOCUMENTED IN MEDICAL RECORD: ICD-10-PCS | Mod: CPTII,S$GLB,, | Performed by: PHYSICIAN ASSISTANT

## 2022-05-31 PROCEDURE — 99213 OFFICE O/P EST LOW 20 MIN: CPT | Mod: S$GLB,,, | Performed by: PHYSICIAN ASSISTANT

## 2022-05-31 PROCEDURE — 1159F MED LIST DOCD IN RCRD: CPT | Mod: CPTII,S$GLB,, | Performed by: PHYSICIAN ASSISTANT

## 2022-05-31 PROCEDURE — 3074F SYST BP LT 130 MM HG: CPT | Mod: CPTII,S$GLB,, | Performed by: PHYSICIAN ASSISTANT

## 2022-05-31 PROCEDURE — 99213 PR OFFICE/OUTPT VISIT, EST, LEVL III, 20-29 MIN: ICD-10-PCS | Mod: S$GLB,,, | Performed by: PHYSICIAN ASSISTANT

## 2022-05-31 PROCEDURE — 73630 X-RAY EXAM OF FOOT: CPT | Mod: FY,RT,S$GLB, | Performed by: RADIOLOGY

## 2022-05-31 PROCEDURE — 3079F DIAST BP 80-89 MM HG: CPT | Mod: CPTII,S$GLB,, | Performed by: PHYSICIAN ASSISTANT

## 2022-05-31 PROCEDURE — 3079F PR MOST RECENT DIASTOLIC BLOOD PRESSURE 80-89 MM HG: ICD-10-PCS | Mod: CPTII,S$GLB,, | Performed by: PHYSICIAN ASSISTANT

## 2022-05-31 PROCEDURE — 73030 XR SHOULDER COMPLETE 2 OR MORE VIEWS LEFT: ICD-10-PCS | Mod: FY,LT,S$GLB, | Performed by: RADIOLOGY

## 2022-05-31 PROCEDURE — 3074F PR MOST RECENT SYSTOLIC BLOOD PRESSURE < 130 MM HG: ICD-10-PCS | Mod: CPTII,S$GLB,, | Performed by: PHYSICIAN ASSISTANT

## 2022-05-31 PROCEDURE — 3008F PR BODY MASS INDEX (BMI) DOCUMENTED: ICD-10-PCS | Mod: CPTII,S$GLB,, | Performed by: PHYSICIAN ASSISTANT

## 2022-05-31 NOTE — PROGRESS NOTES
"Subjective:       Patient ID: Massimo Chanel is a 36 y.o. male.    Vitals:  height is 6' 3" (1.905 m) and weight is 72.6 kg (160 lb). His temperature is 98 °F (36.7 °C). His blood pressure is 126/83 and his pulse is 87. His respiration is 16 and oxygen saturation is 96%.     Chief Complaint: Shoulder Pain (Lt)    Pt presents left shoulder pain for a week    Patient provider note starts here:  Patient presents with complaints of left shoulder pain over the past few days. Reports that he slipped down a flight of stairs and caught himself on the banister which caused his shoulder to dislocate. He had no insurance at the time and he popped his shoulder back into socket himself. His shoulder has not been right since but he has never been evaluated for it. Fell asleep drunk a few nights ago and slept on the left shoulder which he feels exacerbated his pain.   Also reports pain along the medial aspect of the right foot after someone stepped on his foot about 2 weeks ago. Pain with bearing weight.     Shoulder Pain   The pain is present in the left shoulder. This is a new problem. The current episode started in the past 7 days. There has been a history of trauma. The problem occurs constantly. The problem has been unchanged. The quality of the pain is described as aching, dull and burning. The pain is at a severity of 5/10. The pain is moderate. Associated symptoms include an inability to bear weight, stiffness and tingling. Pertinent negatives include no fever or numbness. The symptoms are aggravated by activity. Treatments tried: Aspirin, ibuprofen. The treatment provided no relief.       Constitution: Negative for chills and fever.   HENT: Negative for sore throat.    Neck: Negative for neck pain and neck stiffness.   Cardiovascular: Negative for chest pain.   Respiratory: Negative for chest tightness, cough and wheezing.    Gastrointestinal: Negative for abdominal pain, vomiting and diarrhea.   Musculoskeletal: " Positive for pain and trauma.   Skin: Positive for bruising. Negative for rash and wound.   Allergic/Immunologic: Negative for itching.   Neurological: Negative for numbness and tingling.       Objective:      Physical Exam   Constitutional: He is oriented to person, place, and time. He appears well-developed. He is cooperative.  Non-toxic appearance. He does not appear ill. No distress.   HENT:   Head: Normocephalic and atraumatic.   Ears:   Right Ear: Hearing and external ear normal.   Left Ear: Hearing and external ear normal.   Nose: Nose normal. No mucosal edema, rhinorrhea or nasal deformity. No epistaxis. Right sinus exhibits no maxillary sinus tenderness and no frontal sinus tenderness. Left sinus exhibits no maxillary sinus tenderness and no frontal sinus tenderness.   Mouth/Throat: Uvula is midline, oropharynx is clear and moist and mucous membranes are normal. No trismus in the jaw. Normal dentition. No uvula swelling. No posterior oropharyngeal erythema.   Eyes: Conjunctivae and lids are normal. Right eye exhibits no discharge. Left eye exhibits no discharge. No scleral icterus.   Neck: Trachea normal and phonation normal. Neck supple.   Cardiovascular: Normal rate and normal pulses.   Pulmonary/Chest: Effort normal. No respiratory distress.   Abdominal: Normal appearance.   Musculoskeletal: Normal range of motion.         General: No deformity. Normal range of motion.      Comments: LUE: there is FROM of the left shoulder. No reproducible tenderness with palpation. No tenderness or step offs noted to the clavicle. Normal elbow and wrist. NV intact.    Right foot: there is tenderness along the medial aspect of the first metatarsal. NV intact. Normal ankle.    Neurological: He is alert and oriented to person, place, and time. He exhibits normal muscle tone. Coordination normal.   Skin: Skin is warm, dry, intact, not diaphoretic and not pale. bruising         Comments: There is blue ecchymosis along the  medial aspect of the right 1st metatarsal.    Psychiatric: His speech is normal and behavior is normal. Judgment and thought content normal.   Nursing note and vitals reviewed.        Assessment:       1. Left shoulder pain, unspecified chronicity    2. Right foot pain        XR FOOT COMPLETE 3 VIEW RIGHT  Result Date: 5/31/2022  EXAMINATION: XR FOOT COMPLETE 3 VIEW RIGHT CLINICAL HISTORY: . Pain in right foot TECHNIQUE: AP, lateral, and oblique views of the right foot were performed. COMPARISON: None FINDINGS: No acute fracture or dislocation. Alignment is normal. The Lisfranc articulation is congruent. Joint spaces are preserved.  Bipartite hallux sesamoid noted.   No acute abnormality of the right foot. Electronically signed by: Irwin Torres Date: 05/31/2022 Time: 17:01    XR SHOULDER COMPLETE 2 OR MORE VIEWS LEFT  Result Date: 5/31/2022  EXAMINATION: XR SHOULDER COMPLETE 2 OR MORE VIEWS LEFT CLINICAL HISTORY: Pain in left shoulder TECHNIQUE: Two or three views of the left shoulder were performed. COMPARISON: None FINDINGS: The humeral head is well seated within the glenoid.  No acute fracture or dislocation.  There are mild degenerative changes of the acromioclavicular joint.  The glenohumeral joint is unremarkable.  The remaining visualized osseous structures are intact.   No acute osseous abnormality of the left shoulder. Electronically signed by: Irwin Torres Date: 05/31/2022 Time: 17:00    Plan:         Left shoulder pain, unspecified chronicity  -     XR SHOULDER COMPLETE 2 OR MORE VIEWS LEFT; Future; Expected date: 05/31/2022  -     Ambulatory referral/consult to Orthopedics    Right foot pain  -     XR FOOT COMPLETE 3 VIEW RIGHT; Future; Expected date: 05/31/2022           Medical Decision Making:   History:   Old Medical Records: I decided to obtain old medical records.  Differential Diagnosis:   Differential diagnosis includes but is not limited to: fracture, dislocation, soft tissue  injury  Independently Interpreted Test(s):   I have ordered and independently interpreted X-rays - see summary below.       <> Summary of X-Ray Reading(s): Plain films reviewed and interpreted by me- no acute fracture identified by myself    Clinical Tests:   Radiological Study: Ordered and Reviewed  Urgent Care Management:  Patient presents with complaints of left shoulder pain which has been exacerbated over the past few days but intermittent since that initial injury over 8 years ago.  Also endorses right medial foot pain.  On exam, he is afebrile and nontoxic appearing.  He has full range of motion to all joints and is neurovascularly intact.  Plain films do not show acute fractures or dislocations.  Orthopedic referral was placed on his behalf for further follow-up of his shoulder pain.  Advised NSAIDs, RICE therapy. He verbalized understanding and agreed with plan.        Patient Instructions   If not allergic,take tylenol (acetominophen) for fever control, chills, or body aches every 4 hours. Do not exceed 4000 mg/ day.If not allergic, take Motrin (Ibuprofen) every 4 hours for fever, chills, pain or inflammation. Do not exceed 2400 mg/day. You can alternate taking tylenol and motrin.     You must understand that you've received an Urgent Care treatment only and that you may be released before all your medical problems are known or treated. You, the patient, will arrange for follow up care as instructed.      Follow up with your PCP or specialty clinic as instructed in the next 2-3 days if not improved or as needed. You can call [phone] to schedule an appointment with appropriate provider.      If you condition worsens, we recommend that you receive another evaluation at the emergency room immediately or contact your primary medical clinic's after hours call service to discuss your concerns.      Please return here or go to the Emergency Department for any concerns or worsening condition.

## 2022-05-31 NOTE — PATIENT INSTRUCTIONS
If not allergic,take tylenol (acetominophen) for fever control, chills, or body aches every 4 hours. Do not exceed 4000 mg/ day.If not allergic, take Motrin (Ibuprofen) every 4 hours for fever, chills, pain or inflammation. Do not exceed 2400 mg/day. You can alternate taking tylenol and motrin.     You must understand that you've received an Urgent Care treatment only and that you may be released before all your medical problems are known or treated. You, the patient, will arrange for follow up care as instructed.      Follow up with your PCP or specialty clinic as instructed in the next 2-3 days if not improved or as needed. You can call [phone] to schedule an appointment with appropriate provider.      If you condition worsens, we recommend that you receive another evaluation at the emergency room immediately or contact your primary medical clinic's after hours call service to discuss your concerns.      Please return here or go to the Emergency Department for any concerns or worsening condition.

## 2022-06-02 ENCOUNTER — TELEPHONE (OUTPATIENT)
Dept: URGENT CARE | Facility: CLINIC | Age: 37
End: 2022-06-02
Payer: MEDICAID

## 2022-06-09 ENCOUNTER — TELEPHONE (OUTPATIENT)
Dept: ORTHOPEDICS | Facility: CLINIC | Age: 37
End: 2022-06-09
Payer: MEDICAID

## 2022-06-09 NOTE — TELEPHONE ENCOUNTER
----- Message from Nat Pacheco sent at 6/9/2022  9:03 AM CDT -----  Type:  Sooner Apoointment Request    Name of Caller: Pt   When is the first available appointment? 08/17   Symptoms: left shoulder/ torn rotator cuff   Would the patient rather a call back or a response via [facility]? Call back   Best Call Back Number: [phone]  Additional Information:  pt would like to be seen as soon as possible ... pt is in pain ... pain level -6    Please leave vm ... pt works at night and maybe sleep in the day ...

## 2022-06-15 ENCOUNTER — OFFICE VISIT (OUTPATIENT)
Dept: ORTHOPEDICS | Facility: CLINIC | Age: 37
End: 2022-06-15
Payer: MEDICAID

## 2022-06-15 VITALS
HEIGHT: 75 IN | SYSTOLIC BLOOD PRESSURE: 123 MMHG | HEART RATE: 88 BPM | DIASTOLIC BLOOD PRESSURE: 60 MMHG | BODY MASS INDEX: 20.93 KG/M2 | WEIGHT: 168.31 LBS

## 2022-06-15 DIAGNOSIS — M25.312 SHOULDER INSTABILITY, LEFT: Primary | ICD-10-CM

## 2022-06-15 PROCEDURE — 1159F PR MEDICATION LIST DOCUMENTED IN MEDICAL RECORD: ICD-10-PCS | Mod: CPTII,,, | Performed by: ORTHOPAEDIC SURGERY

## 2022-06-15 PROCEDURE — 1159F MED LIST DOCD IN RCRD: CPT | Mod: CPTII,,, | Performed by: ORTHOPAEDIC SURGERY

## 2022-06-15 PROCEDURE — 3078F DIAST BP <80 MM HG: CPT | Mod: CPTII,,, | Performed by: ORTHOPAEDIC SURGERY

## 2022-06-15 PROCEDURE — 3074F PR MOST RECENT SYSTOLIC BLOOD PRESSURE < 130 MM HG: ICD-10-PCS | Mod: CPTII,,, | Performed by: ORTHOPAEDIC SURGERY

## 2022-06-15 PROCEDURE — 3008F BODY MASS INDEX DOCD: CPT | Mod: CPTII,,, | Performed by: ORTHOPAEDIC SURGERY

## 2022-06-15 PROCEDURE — 1160F RVW MEDS BY RX/DR IN RCRD: CPT | Mod: CPTII,,, | Performed by: ORTHOPAEDIC SURGERY

## 2022-06-15 PROCEDURE — 99213 OFFICE O/P EST LOW 20 MIN: CPT | Mod: PBBFAC,PN | Performed by: ORTHOPAEDIC SURGERY

## 2022-06-15 PROCEDURE — 3078F PR MOST RECENT DIASTOLIC BLOOD PRESSURE < 80 MM HG: ICD-10-PCS | Mod: CPTII,,, | Performed by: ORTHOPAEDIC SURGERY

## 2022-06-15 PROCEDURE — 99999 PR PBB SHADOW E&M-EST. PATIENT-LVL III: ICD-10-PCS | Mod: PBBFAC,,, | Performed by: ORTHOPAEDIC SURGERY

## 2022-06-15 PROCEDURE — 99204 OFFICE O/P NEW MOD 45 MIN: CPT | Mod: S$PBB,,, | Performed by: ORTHOPAEDIC SURGERY

## 2022-06-15 PROCEDURE — 99204 PR OFFICE/OUTPT VISIT, NEW, LEVL IV, 45-59 MIN: ICD-10-PCS | Mod: S$PBB,,, | Performed by: ORTHOPAEDIC SURGERY

## 2022-06-15 PROCEDURE — 3008F PR BODY MASS INDEX (BMI) DOCUMENTED: ICD-10-PCS | Mod: CPTII,,, | Performed by: ORTHOPAEDIC SURGERY

## 2022-06-15 PROCEDURE — 1160F PR REVIEW ALL MEDS BY PRESCRIBER/CLIN PHARMACIST DOCUMENTED: ICD-10-PCS | Mod: CPTII,,, | Performed by: ORTHOPAEDIC SURGERY

## 2022-06-15 PROCEDURE — 3074F SYST BP LT 130 MM HG: CPT | Mod: CPTII,,, | Performed by: ORTHOPAEDIC SURGERY

## 2022-06-15 PROCEDURE — 99999 PR PBB SHADOW E&M-EST. PATIENT-LVL III: CPT | Mod: PBBFAC,,, | Performed by: ORTHOPAEDIC SURGERY

## 2022-06-20 NOTE — PROGRESS NOTES
Patient ID:   Massimo Chanel is a 36 y.o. male.    Chief Complaint:   Left shoulder pain    HPI:   The patient is a 36 year old male who reports a remote history of dislocation of the left shoulder. He states that it was never treated. This was about 8 years ago. He presented to a local urgent care center on 5/31/22 complaining of left shoulder pain x few days after he fell asleep on shoulder drunk. He describes generalized pain in the shoulder. The pain is worse with motion. He denies any relieving factors. He denies any prior treatment.     Medications:    Current Outpatient Medications:     albuterol (ACCUNEB) 0.63 mg/3 mL Nebu, Take 0.63 mg by nebulization every 6 (six) hours as needed. Rescue, Disp: , Rfl:     albuterol (PROVENTIL/VENTOLIN HFA) 90 mcg/actuation inhaler, Inhale 2 puffs into the lungs every 6 (six) hours as needed for Wheezing. Rescue, Disp: 1 Inhaler, Rfl: 0    BREO ELLIPTA 200-25 mcg/dose DsDv diskus inhaler, , Disp: , Rfl:     budesonide (PULMICORT) 0.25 mg/2 mL nebulizer solution, Placed 2 mL in saline suspension/nasal wash and irrigate nose once daily, Disp: , Rfl:     DUPIXENT SYRINGE 300 mg/2 mL Syrg, , Disp: , Rfl:     Allergies:  Review of patient's allergies indicates:   Allergen Reactions    Prednisol ace-gatiflox-bromfen        Past Medical History:  History reviewed. No pertinent past medical history.     Past Surgical History:  History reviewed. No pertinent surgical history.    Social History:  Social History     Occupational History    Not on file   Tobacco Use    Smoking status: Former Smoker     Quit date: 12/2018     Years since quitting: 3.5    Smokeless tobacco: Never Used   Substance and Sexual Activity    Alcohol use: Yes    Drug use: No    Sexual activity: Yes     Partners: Female       Family History:  Family History   Problem Relation Age of Onset    No Known Problems Mother     Diabetes Father         ROS:  Review of Systems   Musculoskeletal:  "Positive for joint pain and myalgias.       Vitals:  /60 (BP Location: Left arm, Patient Position: Sitting, BP Method: Large (Automatic))   Pulse 88   Ht 6' 3" (1.905 m)   Wt 76.4 kg (168 lb 5.1 oz)   BMI 21.04 kg/m²     Physical Examination:  Comprehensive Orthopaedic Musculoskeletal Exam    General        Constitutional: appears stated age, well-developed and well-nourished    Scleral icterus: no    Labored breathing: no    Psychiatric: normal mood and affect and no acute distress    Neurological: alert and oriented x3    Skin: intact    Lymphadenopathy: none     Ortho Exam     Left shoulder exam:  No visible deformity.   Full ROM.   RTC strength 5/5  No identifiable instability pattern.     Imaging:    I have ordered and independently reviewed the following imaging studies performed at [facility] today    XR FOOT COMPLETE 3 VIEW RIGHT  Narrative: EXAMINATION:  XR FOOT COMPLETE 3 VIEW RIGHT    CLINICAL HISTORY:  . Pain in right foot    TECHNIQUE:  AP, lateral, and oblique views of the right foot were performed.    COMPARISON:  None    FINDINGS:  No acute fracture or dislocation. Alignment is normal. The Lisfranc articulation is congruent. Joint spaces are preserved.  Bipartite hallux sesamoid noted.  Impression: No acute abnormality of the right foot.    Electronically signed by: Irwin Torres  Date:    05/31/2022  Time:    17:01  XR SHOULDER COMPLETE 2 OR MORE VIEWS LEFT  Narrative: EXAMINATION:  XR SHOULDER COMPLETE 2 OR MORE VIEWS LEFT    CLINICAL HISTORY:  Pain in left shoulder    TECHNIQUE:  Two or three views of the left shoulder were performed.    COMPARISON:  None    FINDINGS:  The humeral head is well seated within the glenoid.  No acute fracture or dislocation.  There are mild degenerative changes of the acromioclavicular joint.  The glenohumeral joint is unremarkable.  The remaining visualized osseous structures are intact.  Impression: No acute osseous abnormality of the left " shoulder.    Electronically signed by: Irwin Torres  Date:    05/31/2022  Time:    17:00      Assessment:  1. Shoulder instability, left      Plan:  I have recommended physical therapy. Follow-up if no improvement.       Orders Placed This Encounter    Ambulatory referral/consult to Physical/Occupational Therapy     No follow-ups on file.

## 2022-07-12 ENCOUNTER — CLINICAL SUPPORT (OUTPATIENT)
Dept: REHABILITATION | Facility: HOSPITAL | Age: 37
End: 2022-07-12
Payer: MEDICAID

## 2022-07-12 DIAGNOSIS — M62.81 MUSCLE WEAKNESS OF LEFT UPPER EXTREMITY: ICD-10-CM

## 2022-07-12 DIAGNOSIS — M25.312 SHOULDER INSTABILITY, LEFT: ICD-10-CM

## 2022-07-12 DIAGNOSIS — M25.612 DECREASED ROM OF LEFT SHOULDER: ICD-10-CM

## 2022-07-12 PROCEDURE — 97161 PT EVAL LOW COMPLEX 20 MIN: CPT | Mod: PO

## 2022-07-13 NOTE — PLAN OF CARE
[facility]   Physical Therapy Initial Evaluation     Date: 7/12/2022   Name: Massimo Chanel  Clinic Number: [id number]    Therapy Diagnosis:   Encounter Diagnoses   Name Primary?    Shoulder instability, left     Muscle weakness of left upper extremity     Decreased ROM of left shoulder      Physician: Liborio Wagoner MD    Physician Orders: PT Eval and Treat   Medical Diagnosis from Referral: Shoulder instability, left [M25.312]  Evaluation Date: 7/12/2022  Authorization Period Expiration: 6/15/23  Plan of Care Expiration: 11/12/22  Progress Note Due: 8/12/22  Visit # / Visits authorized: 1/ 1   FOTO: 1/3    Precautions: Standard     Time In: 4:00 pm  Time Out: 4:35 pm  Total Appointment Time (timed & untimed codes): 35 minutes      SUBJECTIVE     Date of onset: 8 years with exacerbation 3 months ago     History of current condition - Massimo reports: 8 years ago he dislocated his shoulder and did not have the means to seek medical attention which led to self relocation and googling physical therapy exercises. He was able to get the shoulder functional but it was never back to normal ago. About 3 months ago he slept on the shoulder wrong and has been in 24/7 pain ever since. His shoulder is aggravated by use and is worst after work. 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 Any position with shoulder elevation hurts as does witting with elbow unsupported while watching TV once the shoulder is already flared up. Takes 800mg ibuprofen a day. Denies neck pain. He is also a fulltime musician for upright base guitar and spends a lot of time with shoulder in about 45 degrees of abduction and 10-15 degrees of flexion.     Falls: no.     Imaging, x-ray FINDINGS:  The humeral head is well seated within the glenoid.  No acute fracture or dislocation.  There are mild degenerative changes of  the acromioclavicular joint.  The glenohumeral joint is unremarkable.  The remaining visualized osseous structures are intact.    Prior Therapy: no  Social History:  lives with their spouse  Occupation: pedi-  Prior Level of Function: independent   Current Level of Function: sometimes will have to end shifts early due to shoulder pain.     Pain:  Current 2/10, worst 7/10, best 1/10   Location: left shoulder      Description: Aching and Burning  Aggravating Factors: unsupported elbow, elevation of shoulder especially prolonged, lifting heavy objects  Easing Factors: ibuprofen    Patients goals: reduce shoulder pain, strength the muscle groups around the shoulder      Medical History:   No past medical history on file.    Surgical History:   Massimo Chanel  has no past surgical history on file.    Medications:   Massimo has a current medication list which includes the following prescription(s): albuterol, albuterol, breo ellipta, budesonide, and dupixent syringe.    Allergies:   Review of patient's allergies indicates:   Allergen Reactions    Prednisol ace-gatiflox-bromfen           OBJECTIVE     Observation: left shoulder depressed  Patient appears healthy with overall good posture in both sitting and standing.   Bilateral scapulas abducted and anteriorly tilted L >R        Passive Range of Motion:   Shoulder Right Left   Flexion  with pain   Abduction  with discomfort   ER at 0 NT NT   ER at 90 NT 90 with relocation; 55 without relocation    IR NT NT      Active Range of Motion:   Shoulder Right Left   Flexion 175 155   Abduction 157 110   ER at 0 95 90   ER at 90 95 50   IR at 90 70 70   Reach behind head yes No, afraid of dislocation   Reach behind back  yes yes     Strength:  Shoulder Right Left   Flexion 5/5 4+/5   Abduction 5/5 4+/5   ER 5/5 3/5   IR 5/5 4+/5       Special Tests:   Right Left   Empty Can Test - +   Drop Arm test - -   Subscaputlaris Lift Off - + weakness and pain    O'leonila's test - + shoulder feels like it will pop out of socket   Hawkin's Kenndy - +   Neer's Test - -   Anterior Apprehension test - +   Sulcus Sign - -       Joint Mobility: singificant hypomobility in posterior and inferior glides of the L glenohumeral joint compared to R.     Palpation: tenderness to palpation along lat and teres minor/major  High tone in pec minor/major bilaterally     Sensation: NT    Flexibility:   Lat: R NT ; L NT   Pec Minor: R limited ; L limited      Limitation/Restriction for FOTO shoulder Survey    Therapist reviewed FOTO scores for Massimo Chanel on 7/12/2022.   FOTO documents entered into [employer] - see Media section.    Limitation Score: 38%         TREATMENT     Total Treatment time (time-based codes) separate from Evaluation: 0 minutes      Massimo received the treatments listed below:      therapeutic exercises to develop strength, ROM and posture for 0 minutes including:  Consider periscapular strength and rotator cuff strength    manual therapy techniques: Joint mobilizations and Soft tissue Mobilization were applied to the: L shoulder for 0 minutes, including:  Consider pec minor/major release  L GH joint mobs posterior and inferior      PATIENT EDUCATION AND HOME EXERCISES     Education provided:   - education on condition  -cancellation policy    Written Home Exercises Provided: yes. Exercises were reviewed and Massimo was able to demonstrate them prior to the end of the session.  Massimo demonstrated good  understanding of the education provided. See EMR under Patient Instructions for exercises provided during therapy sessions.    ASSESSMENT     Massimo is a 36 y.o. male referred to outpatient Physical Therapy with a medical diagnosis of Shoulder instability, left [M25.312]. Patient presents with chronic shoulder pain following dislocation 8 years ago with acute exacerbation approx 3 months ago after sleeping on the shoulder in an awkward position. Patient presents with  positive special tests that suggest involvement of the glenohumeral labrum and/or rotator cuff however these findings cannot be confirmed without imaging.While he does have good strength in the left upper extremity, there is some weakness and compensation present as well as tightness in the pectoralis muscles leading to anterior tilting and abduction of the scapula resulting in poor scapular mechanics. Finally, there is significant limitation in glenohumeral joint mobility in posterior and inferior directions which may result in pain with end ranges of shoulder motion. Pt will benefit from skilled PT for upper extremity strengthening, joint mobilizations, and stretching to reduce dysfunction and maximize function to prevent loss of time at work.     Patient prognosis is Good.   Patient will benefit from skilled outpatient Physical Therapy to address the deficits stated above and in the chart below, provide patient /family education, and to maximize patientt's level of independence.     Plan of care discussed with patient: Yes  Patient's spiritual, cultural and educational needs considered and patient is agreeable to the plan of care and goals as stated below:     Anticipated Barriers for therapy: scheduling    Medical Necessity is demonstrated by the following  History  Co-morbidities and personal factors that may impact the plan of care Co-morbidities:   see problem list    Personal Factors:   no deficits     low   Examination  Body Structures and Functions, activity limitations and participation restrictions that may impact the plan of care Body Regions:   upper extremities    Body Systems:    gross symmetry  ROM  strength  gross coordinated movement    Participation Restrictions:   Biking, playing music    Activity limitations:   Learning and applying knowledge  no deficits    General Tasks and Commands  no deficits    Communication  no deficits    Mobility  lifting and carrying objects  driving (bike, car,  motorcycle)    Self care  washing oneself (bathing, drying, washing hands)  caring for body parts (brushing teeth, shaving, grooming)  dressing    Domestic Life  shopping  cooking  doing house work (cleaning house, washing dishes, laundry)    Interactions/Relationships  no deficits    Life Areas  no deficits    Community and Social Life  no deficits         low   Clinical Presentation stable and uncomplicated low   Decision Making/ Complexity Score: low     GOALS: Short Term Goals:  4 weeks  1.Report decreased L shoulder pain < / =  4/10  to increase tolerance for daily activity  2. Increase PROM into ER by 5-10 degrees without passive posterior glide   3. Increased strength by 1/3 MMT grade in L UE to increase tolerance for ADL and work activities.  4. Pt to tolerate HEP to improve ROM and independence with ADL's    Long Term Goals: 8 weeks  1.Report decreased L shoulder pain  < / =  2 /10  to increase tolerance for biking and playing base guitar  2.Increase AROM to L =R to perform work activities without difficulty  3.Increase strength to >/= 4/5 in L UE to increase tolerance for ADL and work activities.  4. Pt goal: Pt to demonstrate ability to put left arm behind head without feeling of oncoming shoulder dislocation.   5. Pt will have improved gcode of CJ (20-40% limited) on FOTO shoulder in order to demonstrate true functional improvement.  6. 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 Pt to report ability to play upright base guitar without increase in pain for regular functioning at his job      PLAN   Plan of care Certification: 7/12/2022 to 11/12/22.    Outpatient Physical Therapy 2 times weekly for 8 weeks to include the following interventions: Manual Therapy, Moist Heat/ Ice, Neuromuscular Re-ed, Patient Education, Therapeutic Activities and Therapeutic Exercise.     Mariama Hassan, PT      I CERTIFY THE NEED FOR THESE SERVICES FURNISHED UNDER THIS PLAN OF  TREATMENT AND WHILE UNDER MY CARE   Physician's comments:     Physician's Signature: ___________________________________________________     I certify the need for these services furnished under this plan of treatment and while under my care.        Liborio Wagoner MD, FAAOS  , Orthopaedic Sports Medicine  Residency   [facility] Department of Orthopaedic Surgery  Assistant Orthopaedic Surgeon, [place] [facility]  Head Team Physician, [place] [facility]

## 2022-07-21 ENCOUNTER — CLINICAL SUPPORT (OUTPATIENT)
Dept: REHABILITATION | Facility: HOSPITAL | Age: 37
End: 2022-07-21
Payer: MEDICAID

## 2022-07-21 DIAGNOSIS — M25.612 DECREASED ROM OF LEFT SHOULDER: ICD-10-CM

## 2022-07-21 DIAGNOSIS — M62.81 MUSCLE WEAKNESS OF LEFT UPPER EXTREMITY: ICD-10-CM

## 2022-07-21 DIAGNOSIS — M25.312 SHOULDER INSTABILITY, LEFT: Primary | ICD-10-CM

## 2022-07-21 PROCEDURE — 97110 THERAPEUTIC EXERCISES: CPT | Mod: PO,CQ

## 2022-07-21 NOTE — PROGRESS NOTES
[facility]   Physical Therapy Treatment Note     Name: Massimo Chanel  Clinic Number: [id number]    Therapy Diagnosis:   Encounter Diagnoses   Name Primary?    Shoulder instability, left Yes    Muscle weakness of left upper extremity     Decreased ROM of left shoulder      Physician: Liborio Wagoner MD    Visit Date: 7/21/2022    Physician Orders: PT Eval and Treat   Medical Diagnosis from Referral: Shoulder instability, left [M25.312]  Evaluation Date: 7/12/2022  Authorization Period Expiration: 6/15/23  Plan of Care Expiration: 11/12/22  Progress Note Due: 8/12/22  Visit # / Visits authorized: 1/20  FOTO: 1/3     Precautions: Standard     PTA Visit #: 1/5     Time In: 2:00 pm  Time Out: 2:45 pm  Total Billable Time: 45 minutes    SUBJECTIVE     Pt reports: his shoulder feels the same. Feels ''achy''.  He was not compliant with home exercise program.  Response to previous treatment: initial eval  Functional change: ongoing    Pain: 2/10  Location: left shoulder      OBJECTIVE     Objective Measures updated at progress report unless specified.     Treatment     Massimo received the treatments listed below:      therapeutic exercises to develop strength, endurance, ROM and flexibility for 45 minutes including:    UBE fwd/backwards 3' each   Prone scap retractions  Prone T, Y 2 x 10 each  Prone row 2 x 10 NP  Sidelying shoulder ER 2 x 10  Supine serratus punch 4# 2 x 10  Shoulder rows with emphasis on scapular retraction 13# on pulleys machine   Serratus slides at wall 2 x 10  Shoulder ext GTB 2 x 10    manual therapy techniques: Joint mobilizations and Soft tissue Mobilization were applied to the: L shoulder for 0 minutes, including:  Pec minor/major release  L GH mobs posterior and inferior        Patient Education and Home Exercises     Home Exercises Provided and Patient Education Provided     Education provided:   - education on condition  - cancellation policy    Written  Home Exercises Provided: yes. Exercises were reviewed and Massimo was able to demonstrate them prior to the end of the session.  Massimo demonstrated good  understanding of the education provided. See EMR under Patient Instructions for exercises provided during therapy sessions    ASSESSMENT     Massimo with good tolerance to first tx session after initial eval. Pt was able to demonstrate good scapular control with exercises after verbal and tactile cues. He experienced some exercise induced fatigue and required a break in between sets. Pt was given HEP and educated on importance of performing exercises at home. Continue to monitor and progress as tolerated.     Massimo Is progressing well towards his goals.   Pt prognosis is Good.     Pt will continue to benefit from skilled outpatient physical therapy to address the deficits listed in the problem list box on initial evaluation, provide pt/family education and to maximize pt's level of independence in the home and community environment.     Pt's spiritual, cultural and educational needs considered and pt agreeable to plan of care and goals.     Anticipated barriers to physical therapy: scheduling    Goals:   Short Term Goals:  4 weeks  1.Report decreased L shoulder pain < / =  4/10  to increase tolerance for daily activity  2. Increase PROM into ER by 5-10 degrees without passive posterior glide   3. Increased strength by 1/3 MMT grade in L UE to increase tolerance for ADL and work activities.  4. Pt to tolerate HEP to improve ROM and independence with ADL's     Long Term Goals: 8 weeks  1.Report decreased L shoulder pain  < / =  2 /10  to increase tolerance for biking and playing base guitar  2.Increase AROM to L =R to perform work activities without difficulty  3.Increase strength to >/= 4/5 in L UE to increase tolerance for ADL and work activities.  4. Pt goal: Pt to demonstrate ability to put left arm behind head without feeling of oncoming shoulder dislocation.   5.  Pt will have improved gcode of CJ (20-40% limited) on FOTO shoulder in order to demonstrate true functional improvement.  6. 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 Pt to report ability to play upright base guitar without increase in pain for regular functioning at his job      PLAN     Continue PT POC.    Socorro Richardson, PTA

## 2022-07-28 ENCOUNTER — CLINICAL SUPPORT (OUTPATIENT)
Dept: REHABILITATION | Facility: HOSPITAL | Age: 37
End: 2022-07-28
Payer: MEDICAID

## 2022-07-28 DIAGNOSIS — M25.612 DECREASED ROM OF LEFT SHOULDER: ICD-10-CM

## 2022-07-28 DIAGNOSIS — M25.312 SHOULDER INSTABILITY, LEFT: Primary | ICD-10-CM

## 2022-07-28 DIAGNOSIS — M62.81 MUSCLE WEAKNESS OF LEFT UPPER EXTREMITY: ICD-10-CM

## 2022-07-28 PROCEDURE — 97110 THERAPEUTIC EXERCISES: CPT | Mod: PO,CQ

## 2022-07-28 NOTE — PROGRESS NOTES
[facility]   Physical Therapy Treatment Note     Name: Massimo Painter Darío  Clinic Number: [id number]    Therapy Diagnosis:   Encounter Diagnoses   Name Primary?    Shoulder instability, left Yes    Muscle weakness of left upper extremity     Decreased ROM of left shoulder      Physician: Liborio Wagoner MD    Visit Date: 7/28/2022    Physician Orders: PT Eval and Treat   Medical Diagnosis from Referral: Shoulder instability, left [M25.312]  Evaluation Date: 7/12/2022  Authorization Period Expiration: 6/15/23  Plan of Care Expiration: 11/12/22  Progress Note Due: 8/12/22  Visit # / Visits authorized: 2/20  FOTO: 1/3     Precautions: Standard     PTA Visit #: 1/5     Time In: 2:00 pm  Time Out: 2:40 pm  Total Billable Time: 40 minutes    SUBJECTIVE     Pt reports: he feels like his shoulder is getting better.   He was not compliant with home exercise program.  Response to previous treatment: Felt fine.    Functional change: ongoing    Pain: 2/10  Location: left shoulder      OBJECTIVE     Objective Measures updated at progress report unless specified.     Treatment     Massimo received the treatments listed below:      therapeutic exercises to develop strength, endurance, ROM and flexibility for 40 minutes including:    UBE fwd/backwards 6'  Prone scap retractions  Prone T, Y 2 x 10 each  Prone row 2 x 10 NP  Sidelying shoulder ER 2 x 10  Supine serratus punch 4# 3 x 10  Shoulder rows with emphasis on scapular retraction 13# on pulleys machine 2 x 10   Serratus slides at wall 2 x 10  Shoulder ext GTB 2 x 10    manual therapy techniques: Joint mobilizations and Soft tissue Mobilization were applied to the: L shoulder for 0 minutes, including:    Pec minor/major release  L GH mobs posterior and inferior        Patient Education and Home Exercises     Home Exercises Provided and Patient Education Provided     Education provided:   - education on condition  - cancellation  policy    Written Home Exercises Provided: yes. Exercises were reviewed and Massimo was able to demonstrate them prior to the end of the session.  Massimo demonstrated good  understanding of the education provided. See EMR under Patient Instructions for exercises provided during therapy sessions    ASSESSMENT      Pt with no pain post treatment session. Pt with noted positive response to his therapeutic exercises post treatment session.  Pt required some minor cueing on proper scap retraction and depression technique.  Will continue to monitor and progress pt within his tolerance.     Massimo Is progressing well towards his goals.   Pt prognosis is Good.     Pt will continue to benefit from skilled outpatient physical therapy to address the deficits listed in the problem list box on initial evaluation, provide pt/family education and to maximize pt's level of independence in the home and community environment.     Pt's spiritual, cultural and educational needs considered and pt agreeable to plan of care and goals.     Anticipated barriers to physical therapy: scheduling    Goals:   Short Term Goals:  4 weeks  1.Report decreased L shoulder pain < / =  4/10  to increase tolerance for daily activity  2. Increase PROM into ER by 5-10 degrees without passive posterior glide   3. Increased strength by 1/3 MMT grade in L UE to increase tolerance for ADL and work activities.  4. Pt to tolerate HEP to improve ROM and independence with ADL's     Long Term Goals: 8 weeks  1.Report decreased L shoulder pain  < / =  2 /10  to increase tolerance for biking and playing base guitar  2.Increase AROM to L =R to perform work activities without difficulty  3.Increase strength to >/= 4/5 in L UE to increase tolerance for ADL and work activities.  4. Pt goal: Pt to demonstrate ability to put left arm behind head without feeling of oncoming shoulder dislocation.   5. Pt will have improved gcode of CJ (20-40% limited) on FOTO shoulder in order  to demonstrate true functional improvement.  6. 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 Pt to report ability to play upright base guitar without increase in pain for regular functioning at his job      PLAN     Continue PT POC.    Seun Torres, PTA

## 2022-08-01 ENCOUNTER — CLINICAL SUPPORT (OUTPATIENT)
Dept: REHABILITATION | Facility: HOSPITAL | Age: 37
End: 2022-08-01
Payer: MEDICAID

## 2022-08-01 DIAGNOSIS — M62.81 MUSCLE WEAKNESS OF LEFT UPPER EXTREMITY: ICD-10-CM

## 2022-08-01 DIAGNOSIS — M25.312 SHOULDER INSTABILITY, LEFT: Primary | ICD-10-CM

## 2022-08-01 DIAGNOSIS — M25.612 DECREASED ROM OF LEFT SHOULDER: ICD-10-CM

## 2022-08-01 PROCEDURE — 97110 THERAPEUTIC EXERCISES: CPT | Mod: PO,CQ

## 2022-08-01 NOTE — PROGRESS NOTES
[facility]   Physical Therapy Treatment Note     Name: Massimo Painter Darío  Clinic Number: [id number]    Therapy Diagnosis:   Encounter Diagnoses   Name Primary?    Shoulder instability, left Yes    Muscle weakness of left upper extremity     Decreased ROM of left shoulder      Physician: Liborio Wagoner MD    Visit Date: 8/1/2022    Physician Orders: PT Eval and Treat   Medical Diagnosis from Referral: Shoulder instability, left [M25.312]  Evaluation Date: 7/12/2022  Authorization Period Expiration: 6/15/23  Plan of Care Expiration: 11/12/22  Progress Note Due: 8/12/22  Visit # / Visits authorized: 2/20  FOTO: 1/3     Precautions: Standard     PTA Visit #: 1/5     Time In: 2:30 pm  Time Out: 3:10 pm  Total Billable Time: 40 minutes    SUBJECTIVE     Pt reports: he feels like his shoulder is getting better.   He was not compliant with home exercise program.  Response to previous treatment: Felt fine.    Functional change: ongoing    Pain: 2/10  Location: left shoulder      OBJECTIVE     Objective Measures updated at progress report unless specified.     Treatment     Massimo received the treatments listed below:      therapeutic exercises to develop strength, endurance, ROM and flexibility for 0 minutes including:    UBE fwd/backwards 6'   Prone scap retractions  Prone T, Y 2 x 10 each  Prone row 2 x 10 NP  Sidelying shoulder ER 3 x 10 1#  Supine serratus punch 4# 3 x 10  Shoulder rows with emphasis on scapular retraction 13# on pulleys machine 2 x 10   Serratus slides at wall 2 x 10  Shoulder ext GTB 2 x 10    manual therapy techniques: Joint mobilizations and Soft tissue Mobilization were applied to the: L shoulder for 0 minutes, including:    Pec minor/major release  L GH mobs posterior and inferior        Patient Education and Home Exercises     Home Exercises Provided and Patient Education Provided     Education provided:   - education on condition  - cancellation  policy    Written Home Exercises Provided: yes. Exercises were reviewed and Massimo was able to demonstrate them prior to the end of the session.  Massimo demonstrated good  understanding of the education provided. See EMR under Patient Instructions for exercises provided during therapy sessions    ASSESSMENT     Pt with good response to PT treatment and no reports of increased pain post treatment.  Will continue to monitor and progress pt within his tolerance.     Massimo Is progressing well towards his goals.   Pt prognosis is Good.     Pt will continue to benefit from skilled outpatient physical therapy to address the deficits listed in the problem list box on initial evaluation, provide pt/family education and to maximize pt's level of independence in the home and community environment.     Pt's spiritual, cultural and educational needs considered and pt agreeable to plan of care and goals.     Anticipated barriers to physical therapy: scheduling    Goals:   Short Term Goals:  4 weeks  1.Report decreased L shoulder pain < / =  4/10  to increase tolerance for daily activity  2. Increase PROM into ER by 5-10 degrees without passive posterior glide   3. Increased strength by 1/3 MMT grade in L UE to increase tolerance for ADL and work activities.  4. Pt to tolerate HEP to improve ROM and independence with ADL's     Long Term Goals: 8 weeks  1.Report decreased L shoulder pain  < / =  2 /10  to increase tolerance for biking and playing base guitar  2.Increase AROM to L =R to perform work activities without difficulty  3.Increase strength to >/= 4/5 in L UE to increase tolerance for ADL and work activities.  4. Pt goal: Pt to demonstrate ability to put left arm behind head without feeling of oncoming shoulder dislocation.   5. Pt will have improved gcode of CJ (20-40% limited) on FOTO shoulder in order to demonstrate true functional improvement.  6. 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 Pt to report ability to play upright base guitar without increase in pain for regular functioning at his job      PLAN     Continue PT POC.    Seun Torres, PTA

## 2022-08-04 ENCOUNTER — CLINICAL SUPPORT (OUTPATIENT)
Dept: REHABILITATION | Facility: HOSPITAL | Age: 37
End: 2022-08-04
Payer: MEDICAID

## 2022-08-04 DIAGNOSIS — M62.81 MUSCLE WEAKNESS OF LEFT UPPER EXTREMITY: ICD-10-CM

## 2022-08-04 DIAGNOSIS — M25.612 DECREASED ROM OF LEFT SHOULDER: ICD-10-CM

## 2022-08-04 DIAGNOSIS — M25.312 SHOULDER INSTABILITY, LEFT: Primary | ICD-10-CM

## 2022-08-04 PROCEDURE — 97110 THERAPEUTIC EXERCISES: CPT | Mod: PO,CQ

## 2022-08-04 NOTE — PROGRESS NOTES
[facility]   Physical Therapy Treatment Note     Name: Massimo Painter Darío  Clinic Number: [id number]    Therapy Diagnosis:   Encounter Diagnoses   Name Primary?    Shoulder instability, left Yes    Muscle weakness of left upper extremity     Decreased ROM of left shoulder      Physician: Liborio Wagoner MD    Visit Date: 8/4/2022    Physician Orders: PT Eval and Treat   Medical Diagnosis from Referral: Shoulder instability, left [M25.312]  Evaluation Date: 7/12/2022  Authorization Period Expiration: 6/15/23  Plan of Care Expiration: 11/12/22  Progress Note Due: 8/12/22  Visit # / Visits authorized: 4/20  FOTO: 1/3     Precautions: Standard     PTA Visit #: 2/5     Time In: 2:00 pm  Time Out: 2:40 pm  Total Billable Time: 40 minutes    SUBJECTIVE     Pt reports: he is a little sore today from doing some housework.  He was not compliant with home exercise program.  Response to previous treatment: Felt fine.    Functional change: ongoing    Pain: 2/10  Location: left shoulder      OBJECTIVE     Objective Measures updated at progress report unless specified.     Treatment     Massimo received the treatments listed below:      therapeutic exercises to develop strength, endurance, ROM and flexibility for 40 minutes including:    UBE fwd/backwards 6'   Prone scap retractions  Prone T, Y 2 x 10 each  Prone row 2 x 10 +3#  Sidelying shoulder ER 3 x 10 1#  Supine serratus punch 4# 3 x 10  Shoulder rows with emphasis on scapular retraction 13# on pulleys machine 2 x 10  Serratus slides at wall 2 x 10   + Yellow weighted ball on wall circles for shoulder stability CC/CCW x1min ea  Shoulder ext GTB 2 x 10    manual therapy techniques: Joint mobilizations and Soft tissue Mobilization were applied to the: L shoulder for 0 minutes, including:  Pec minor/major release  L GH mobs posterior and inferior        Patient Education and Home Exercises     Home Exercises Provided and Patient Education  Provided     Education provided:   - education on condition  - cancellation policy    Written Home Exercises Provided: yes. Exercises were reviewed and Massimo was able to demonstrate them prior to the end of the session.  Massimo demonstrated good  understanding of the education provided. See EMR under Patient Instructions for exercises provided during therapy sessions    ASSESSMENT     Massimo with good tolerance to today's tx including addition of stability exercises as noted above. Pt required rest breaks in between sets due to soreness and fatigue from doing house work but performed all exercises with proper form and muscle engagement with min verbal cues. Pt was advised to perform HEP while being away on vacation and educated on importance of being compliant. He verbalized good understanding. Will continue to monitor and progress pt within his tolerance.     Massimo Is progressing well towards his goals.   Pt prognosis is Good.     Pt will continue to benefit from skilled outpatient physical therapy to address the deficits listed in the problem list box on initial evaluation, provide pt/family education and to maximize pt's level of independence in the home and community environment.     Pt's spiritual, cultural and educational needs considered and pt agreeable to plan of care and goals.     Anticipated barriers to physical therapy: scheduling    Goals:   Short Term Goals:  4 weeks  1.Report decreased L shoulder pain < / =  4/10  to increase tolerance for daily activity  2. Increase PROM into ER by 5-10 degrees without passive posterior glide   3. Increased strength by 1/3 MMT grade in L UE to increase tolerance for ADL and work activities.  4. Pt to tolerate HEP to improve ROM and independence with ADL's     Long Term Goals: 8 weeks  1.Report decreased L shoulder pain  < / =  2 /10  to increase tolerance for biking and playing base guitar  2.Increase AROM to L =R to perform work activities without  difficulty  3.Increase strength to >/= 4/5 in L UE to increase tolerance for ADL and work activities.  4. Pt goal: Pt to demonstrate ability to put left arm behind head without feeling of oncoming shoulder dislocation.   5. Pt will have improved gcode of CJ (20-40% limited) on FOTO shoulder in order to demonstrate true functional improvement.  6. 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 Pt to report ability to play upright base guitar without increase in pain for regular functioning at his job      PLAN     Continue PT POC.    Socorro Richardson, PTA

## 2022-08-18 ENCOUNTER — CLINICAL SUPPORT (OUTPATIENT)
Dept: REHABILITATION | Facility: HOSPITAL | Age: 37
End: 2022-08-18
Payer: MEDICAID

## 2022-08-18 DIAGNOSIS — M25.612 DECREASED ROM OF LEFT SHOULDER: ICD-10-CM

## 2022-08-18 DIAGNOSIS — M25.312 SHOULDER INSTABILITY, LEFT: Primary | ICD-10-CM

## 2022-08-18 DIAGNOSIS — M62.81 MUSCLE WEAKNESS OF LEFT UPPER EXTREMITY: ICD-10-CM

## 2022-08-18 PROCEDURE — 97110 THERAPEUTIC EXERCISES: CPT | Mod: PO

## 2022-08-18 NOTE — PROGRESS NOTES
[facility]   Physical Therapy Treatment Note / Reassessment    Name: Massimo Chanel  Clinic Number: [id number]    Therapy Diagnosis:   Encounter Diagnoses   Name Primary?    Shoulder instability, left Yes    Muscle weakness of left upper extremity     Decreased ROM of left shoulder      Physician: Liborio Wagoner MD    Visit Date: 8/18/2022    Physician Orders: PT Eval and Treat   Medical Diagnosis from Referral: Shoulder instability, left [M25.312]  Evaluation Date: 7/12/2022  Authorization Period Expiration: 6/15/23  Plan of Care Expiration: 11/12/22  Progress Note Due: 9/18/22  Visit # / Visits authorized: 4/20  FOTO: 2/3       Precautions: Standard     PTA Visit #: 0/5     Time In: 2:05  Time Out: 2:45  Total Billable Time: 40 minutes    SUBJECTIVE     Pt reports: got acupuncture which helped a lot and also feels that PT has helped increase strength. Shoulder is not hurting 24/7 like it was previously.   He was compliant with home exercise program.  Response to previous treatment: Felt fine.    Functional change: ongoing    Pain: 1/10  Location: left shoulder      OBJECTIVE     Observation: left shoulder depressed  Patient appears healthy with overall good posture in both sitting and standing.   Bilateral scapulas abducted and anteriorly tilted L >R           Passive Range of Motion:   Shoulder Right Left   Flexion  with pain   Abduction  with discomfort   ER at 0 NT NT   ER at 90 NT 90 with relocation; 55 without relocation    IR NT NT      Active Range of Motion:   Shoulder Right Left   Flexion 175 120   Abduction 157 125   ER at 0 95 68   ER at 90 95 55   IR at 90 70 70   Reach behind head yes No, afraid of dislocation   Reach behind back  yes yes      Strength:  Shoulder Right Left   Flexion 5/5 4+/5   Abduction 5/5 4+/5   ER 5/5 3/5   IR 5/5 4+/5         Joint Mobility: singificant hypomobility in posterior and inferior glides of the L glenohumeral  joint compared to R.      Palpation: tenderness to palpation along lat and teres minor/major  High tone in pec minor/major bilaterally            Limitation/Restriction for FOTO shoulder Survey     Therapist reviewed FOTO scores for Massimo Chanel on 8/18/2022.   FOTO documents entered into EP[employer] - see Media section.     Limitation Score: 41%             Treatment     Massimo received the treatments listed below:      therapeutic exercises to develop strength, endurance, ROM and flexibility for 20 minutes including:    UBE fwd/backwards 6'   Prone scap retractions  Prone T, Y 2 x 10 each  Prone row 2 x 10 +3#  Sidelying shoulder ER 3 x 10 1#  Supine serratus punch 4# 3 x 10  Shoulder rows with emphasis on scapular retraction 13# on pulleys machine 2 x 10  Serratus slides at wall 2 x 10   Yellow weighted ball on wall circles for shoulder stability CC/CCW x1min ea  Shoulder ext GTB 2 x 10    manual therapy techniques: Joint mobilizations and Soft tissue Mobilization were applied to the: L shoulder for 20 minutes, including:  Pec minor/major release  L GH mobs posterior and inferior  SOR  Manual traction        Patient Education and Home Exercises     Home Exercises Provided and Patient Education Provided     Education provided:   - education on condition  - cancellation policy    Written Home Exercises Provided: yes. Exercises were reviewed and Massimo was able to demonstrate them prior to the end of the session.  Massimo demonstrated good  understanding of the education provided. See EMR under Patient Instructions for exercises provided during therapy sessions    ASSESSMENT     Massimo is doing well today with some loss in shoulder ROM however much less pain as compared to initial eval. He does report some neck pain today with radiating pain into the L ulnar distribution but this is relieved with cervical distraction. Discussed chin tucks and scap squeezes to also assist with this pain. Shoulder manual and  strengthening tolerated well today. Continue to progress.     Massimo Is progressing well towards his goals.   Pt prognosis is Good.     Pt will continue to benefit from skilled outpatient physical therapy to address the deficits listed in the problem list box on initial evaluation, provide pt/family education and to maximize pt's level of independence in the home and community environment.     Pt's spiritual, cultural and educational needs considered and pt agreeable to plan of care and goals.     Anticipated barriers to physical therapy: scheduling    Goals:   Short Term Goals:  4 weeks  1.Report decreased L shoulder pain < / =  4/10  to increase tolerance for daily activity PROGRESSING  2. Increase PROM into ER by 5-10 degrees without passive posterior glide PROGRESSING  3. Increased strength by 1/3 MMT grade in L UE to increase tolerance for ADL and work activities.PROGRESSING  4. Pt to tolerate HEP to improve ROM and independence with ADL's PROGRESSING     Long Term Goals: 8 weeks  1.Report decreased L shoulder pain  < / =  2 /10  to increase tolerance for biking and playing base guitar PROGRESSING  2.Increase AROM to L =R to perform work activities without difficulty PROGRESSING  3.Increase strength to >/= 4/5 in L UE to increase tolerance for ADL and work activities. PROGRESSING  4. Pt goal: Pt to demonstrate ability to put left arm behind head without feeling of oncoming shoulder dislocation. PROGRESSING  5. Pt will have improved gcode of CJ (20-40% limited) on FOTO shoulder in order to demonstrate true functional improvement. PROGRESSING  6. 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 Pt to report ability to play upright base guitar without increase in pain for regular functioning at his job PROGRESSING      PLAN     Continue PT POC.    Mariama Hassan, PT

## 2022-08-22 ENCOUNTER — CLINICAL SUPPORT (OUTPATIENT)
Dept: REHABILITATION | Facility: HOSPITAL | Age: 37
End: 2022-08-22
Payer: MEDICAID

## 2022-08-22 DIAGNOSIS — M25.612 DECREASED ROM OF LEFT SHOULDER: ICD-10-CM

## 2022-08-22 DIAGNOSIS — M25.312 SHOULDER INSTABILITY, LEFT: Primary | ICD-10-CM

## 2022-08-22 DIAGNOSIS — M62.81 MUSCLE WEAKNESS OF LEFT UPPER EXTREMITY: ICD-10-CM

## 2022-08-22 PROCEDURE — 97110 THERAPEUTIC EXERCISES: CPT | Mod: PO,CQ

## 2022-08-22 NOTE — PROGRESS NOTES
[facility]   Physical Therapy Treatment Note / Reassessment    Name: Massimo Chanel  Clinic Number: [id number]    Therapy Diagnosis:   Encounter Diagnoses   Name Primary?    Shoulder instability, left Yes    Muscle weakness of left upper extremity     Decreased ROM of left shoulder      Physician: Liborio Wagoner MD    Visit Date: 8/22/2022    Physician Orders: PT Eval and Treat   Medical Diagnosis from Referral: Shoulder instability, left [M25.312]  Evaluation Date: 7/12/2022  Authorization Period Expiration: 6/15/23  Plan of Care Expiration: 11/12/22  Progress Note Due: 9/18/22  Visit # / Visits authorized: 6/20  FOTO: 2/3       Precautions: Standard     PTA Visit #: 1/5     Time In: 2:30  Time Out: 3:10  Total Billable Time: 40 minutes    SUBJECTIVE     Pt reports:  His shoulder is a little sore from work the last 3 days but note's he hasn't really had pain.   He was compliant with home exercise program.  Response to previous treatment: Felt fine.    Functional change: ongoing    Pain: /10  Location: left shoulder      OBJECTIVE       Treatment     Massimo received the treatments listed below:      therapeutic exercises to develop strength, endurance, ROM and flexibility for 32 minutes including:    UBE fwd/backwards 6'   Prone scap retractions  Prone T, Y 2 x 10 each thumb up/down   Prone row 3 x 10 +3#  Sidelying shoulder ER 3 x 10 1#  Supine serratus punch 4# 3 x 10  Shoulder rows with emphasis on scapular retraction 13# on pulleys machine 2 x 10  Serratus slides at wall 2 x 10   Yellow weighted ball on wall circles for shoulder stability CC/CCW x1min ea  Shoulder ext GTB 2 x 10    manual therapy techniques: Joint mobilizations and Soft tissue Mobilization were applied to the: L shoulder for 8 minutes, including:    Pec minor/major release  L GH mobs posterior and inferior  SOR  Manual traction        Patient Education and Home Exercises     Home Exercises Provided and  Patient Education Provided     Education provided:   - education on condition  - cancellation policy    Written Home Exercises Provided: yes. Exercises were reviewed and Massimo was able to demonstrate them prior to the end of the session.  Massimo demonstrated good  understanding of the education provided. See EMR under Patient Instructions for exercises provided during therapy sessions    ASSESSMENT     1# weights were added for prone T's and Y's.  Pt continue's to respond well to his current PT POC.  Will continue to monitor and progress pt within his tolerance.     Massimo Is progressing well towards his goals.   Pt prognosis is Good.     Pt will continue to benefit from skilled outpatient physical therapy to address the deficits listed in the problem list box on initial evaluation, provide pt/family education and to maximize pt's level of independence in the home and community environment.     Pt's spiritual, cultural and educational needs considered and pt agreeable to plan of care and goals.     Anticipated barriers to physical therapy: scheduling    Goals:   Short Term Goals:  4 weeks  1.Report decreased L shoulder pain < / =  4/10  to increase tolerance for daily activity PROGRESSING  2. Increase PROM into ER by 5-10 degrees without passive posterior glide PROGRESSING  3. Increased strength by 1/3 MMT grade in L UE to increase tolerance for ADL and work activities.PROGRESSING  4. Pt to tolerate HEP to improve ROM and independence with ADL's PROGRESSING     Long Term Goals: 8 weeks  1.Report decreased L shoulder pain  < / =  2 /10  to increase tolerance for biking and playing base guitar PROGRESSING  2.Increase AROM to L =R to perform work activities without difficulty PROGRESSING  3.Increase strength to >/= 4/5 in L UE to increase tolerance for ADL and work activities. PROGRESSING  4. Pt goal: Pt to demonstrate ability to put left arm behind head without feeling of oncoming shoulder dislocation. PROGRESSING  5.  Pt will have improved gcode of CJ (20-40% limited) on FOTO shoulder in order to demonstrate true functional improvement. PROGRESSING  6. 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 Pt to report ability to play upright base guitar without increase in pain for regular functioning at his job PROGRESSING      PLAN     Continue PT POC.    Seun Torres, PTA

## 2022-09-01 ENCOUNTER — CLINICAL SUPPORT (OUTPATIENT)
Dept: REHABILITATION | Facility: HOSPITAL | Age: 37
End: 2022-09-01
Payer: MEDICAID

## 2022-09-01 DIAGNOSIS — M25.312 SHOULDER INSTABILITY, LEFT: Primary | ICD-10-CM

## 2022-09-01 DIAGNOSIS — M62.81 MUSCLE WEAKNESS OF LEFT UPPER EXTREMITY: ICD-10-CM

## 2022-09-01 DIAGNOSIS — M25.612 DECREASED ROM OF LEFT SHOULDER: ICD-10-CM

## 2022-09-01 PROCEDURE — 97110 THERAPEUTIC EXERCISES: CPT | Mod: PO

## 2022-09-01 NOTE — PROGRESS NOTES
"[facility]   Physical Therapy Treatment Note    Name: Massimo Chanel  Clinic Number: [id number]    Therapy Diagnosis:   Encounter Diagnoses   Name Primary?    Shoulder instability, left Yes    Muscle weakness of left upper extremity     Decreased ROM of left shoulder        Physician: Liborio Wagoner MD    Visit Date: 9/1/2022    Physician Orders: PT Eval and Treat   Medical Diagnosis from Referral: Shoulder instability, left [M25.312]  Evaluation Date: 7/12/2022  Authorization Period Expiration: 6/15/23  Plan of Care Expiration: 11/12/22  Progress Note Due: 9/18/22  Visit # / Visits authorized: 7/20  FOTO: 2/3       Precautions: Standard     PTA Visit #: 0/5     Time In: 2:00  Time Out: 2:45  Total Billable Time: 45 minutes    SUBJECTIVE     Pt reports:  mild soreness from overhead work but feeling much better overall  He was compliant with home exercise program.  Response to previous treatment: Felt fine.    Functional change: ongoing    Pain: 0/10  Location: left shoulder      OBJECTIVE     Objective measures taken at progress report unless specified otherwise.     Treatment     Massimo received the treatments listed below:      therapeutic exercises to develop strength, endurance, ROM and flexibility for 40 minutes including:    UBE fwd/backwards 6'   Prone scap retractions 30x3"  Prone row 3 x 10 +3#  Prone row with ER at 90 degrees abduction 3x8  Sidelying shoulder ER 3 x 10 1#  Supine serratus punch 4# 3 x 10  Shoulder rows with emphasis on scapular retraction 13# on pulleys machine 2 x 10  Shoulder B ER with yellow theraband at wall 3x8  Serratus slides at wall 2 x 10   Yellow weighted ball on wall circles for shoulder stability CC/CCW x1min ea  Prone T, Y 3x8 each thumb up/down on red swiss ball   Shoulder ext GTB 2 x 10    manual therapy techniques: Joint mobilizations and Soft tissue Mobilization were applied to the: L shoulder for 5 minutes, including:    Pec " minor/major release  L GH mobs posterior and inferior  Hypervolt massage gun to upper trap, levator scap, thoracic paraspinal L  SOR  Manual traction        Patient Education and Home Exercises     Home Exercises Provided and Patient Education Provided     Education provided:   - education on condition  - cancellation policy    Written Home Exercises Provided: yes. Exercises were reviewed and Massimo was able to demonstrate them prior to the end of the session.  Massimo demonstrated good  understanding of the education provided. See EMR under Patient Instructions for exercises provided during therapy sessions    ASSESSMENT     Darío doing well today with no pain at end of session except in upper trap and levator scap region where he has mild discomfort. He tolerated session very well including newly added activities. Progress rotator cuff and giselle-scapular strength as tolerated.     Massimo Is progressing well towards his goals.   Pt prognosis is Good.     Pt will continue to benefit from skilled outpatient physical therapy to address the deficits listed in the problem list box on initial evaluation, provide pt/family education and to maximize pt's level of independence in the home and community environment.     Pt's spiritual, cultural and educational needs considered and pt agreeable to plan of care and goals.     Anticipated barriers to physical therapy: scheduling    Goals:   Short Term Goals:  4 weeks  1.Report decreased L shoulder pain < / =  4/10  to increase tolerance for daily activity PROGRESSING  2. Increase PROM into ER by 5-10 degrees without passive posterior glide PROGRESSING  3. Increased strength by 1/3 MMT grade in L UE to increase tolerance for ADL and work activities.PROGRESSING  4. Pt to tolerate HEP to improve ROM and independence with ADL's PROGRESSING     Long Term Goals: 8 weeks  1.Report decreased L shoulder pain  < / =  2 /10  to increase tolerance for biking and playing base guitar  PROGRESSING  2.Increase AROM to L =R to perform work activities without difficulty PROGRESSING  3.Increase strength to >/= 4/5 in L UE to increase tolerance for ADL and work activities. PROGRESSING  4. Pt goal: Pt to demonstrate ability to put left arm behind head without feeling of oncoming shoulder dislocation. PROGRESSING  5. Pt will have improved gcode of CJ (20-40% limited) on FOTO shoulder in order to demonstrate true functional improvement. PROGRESSING  6. 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 Pt to report ability to play upright base guitar without increase in pain for regular functioning at his job PROGRESSING      PLAN     Continue PT POC.    Mariama Hassan, PT

## 2022-09-22 ENCOUNTER — CLINICAL SUPPORT (OUTPATIENT)
Dept: REHABILITATION | Facility: HOSPITAL | Age: 37
End: 2022-09-22
Attending: ORTHOPAEDIC SURGERY
Payer: MEDICAID

## 2022-09-22 DIAGNOSIS — M25.312 SHOULDER INSTABILITY, LEFT: Primary | ICD-10-CM

## 2022-09-22 DIAGNOSIS — M62.81 MUSCLE WEAKNESS OF LEFT UPPER EXTREMITY: ICD-10-CM

## 2022-09-22 DIAGNOSIS — M25.612 DECREASED ROM OF LEFT SHOULDER: ICD-10-CM

## 2022-09-22 PROCEDURE — 97110 THERAPEUTIC EXERCISES: CPT | Mod: PO

## 2022-09-22 PROCEDURE — 97112 NEUROMUSCULAR REEDUCATION: CPT | Mod: PO

## 2022-09-22 NOTE — PROGRESS NOTES
[facility]   Physical Therapy Treatment Note / Reassessment    Name: Massimo Chanel  Clinic Number: [id number]    Therapy Diagnosis:   No diagnosis found.      Physician: Liborio Wagoner MD    Visit Date: 9/22/2022    Physician Orders: PT Eval and Treat   Medical Diagnosis from Referral: Shoulder instability, left [M25.312]  Evaluation Date: 7/12/2022  Authorization Period Expiration: 6/15/23  Plan of Care Expiration: 11/12/22  Progress Note Due: 10/22/22  Visit # / Visits authorized: 9/20  FOTO: 3/3 NEEDS DISCHARGE FOTO       Precautions: Standard     PTA Visit #: 0/5     Time In: 2:00  Time Out: 2:46  Total Billable Time: 46 minutes    SUBJECTIVE     Pt reports: still noticing improvement - has been consistent with scap squeezes and with the theraband work   He was compliant with home exercise program.  Response to previous treatment: Felt fine.    Functional change: ongoing    Pain: 0/10  Location: left shoulder      OBJECTIVE        Passive Range of Motion:  NOT TESTED TODAY  Shoulder Right Left   Flexion  with pain   Abduction  with discomfort   ER at 0 NT NT   ER at 90 NT 90 with relocation; 55 without relocation    IR NT NT      Active Range of Motion:   Shoulder Right Left   Flexion 175 160   Abduction 157 160    ER at 0 95 90   ER at 90 95 63 with mild discomfort   IR at 90 70 70   Reach behind head yes yes   Reach behind back  yes yes      Strength:  Shoulder Right Left   Flexion 5/5 4+/5   Abduction 5/5 4+/5   ER 5/5 3/5   IR 5/5 4+/5         Special Tests:    Right Left   Empty Can Test - +   Drop Arm test - -   Subscaputlaris Lift Off - -   O'leonila's test - + shoulder feels like it will pop out of socket   Hawkin's Kenndy - + feels like shoulder will dislocate   Neer's Test - -   Anterior Apprehension test - NT today   Sulcus Sign - -         Joint Mobility: singificant hypomobility in posterior and inferior glides of the L glenohumeral joint compared  "to R.      Palpation: tenderness to palpation along lat and teres minor/major  High tone in pec minor/major bilaterally      Sensation: NT     Flexibility:   Lat: R NT ; L NT              Pec Minor: R limited ; L limited        Limitation/Restriction for FOTO shoulder Survey     Therapist reviewed FOTO scores for Massimo Chanel on 9/22/2022.   FOTO documents entered into [employer] - see Media section.     Limitation Score: 35%           Treatment     Massimo received the treatments listed below:      Neuromuscular re-education for 8 minutes:  +rhythmic stabilization in supine at 90 degrees shoulder flexion with elbow straight  +supine lat eccentrics with 4# dowel 15x5"  Prone scap retractions 30x3"    therapeutic exercises to develop strength, endurance, ROM and flexibility for 38 minutes including:    UBE fwd/backwards 6'   Prone row 3 x 10 +3#  Prone row with ER at 90 degrees abduction 3x8  Sidelying shoulder ER 3 x 10 1#  Supine serratus punch 4# 3 x 10  Shoulder rows with emphasis on scapular retraction 13# on pulleys machine 2 x 10  +shoulder ER walkouts at 90 deg shoulder flexion with yellow theraband 2x10  +shoulder internal rotation at 90/90 with yellow theraband 3x8  Shoulder B ER with yellow theraband at wall 3x8  Serratus slides at wall 2 x 10   Yellow weighted ball on wall circles for shoulder stability side to side, up and down, CC/CCW x20 ea  Prone T, Y 3x8 each thumb up/down on red swiss ball   Shoulder ext GTB 2 x 10    Next visit:   ball rebounds on wall at 90 degrees abduction and elbow flexion  Shoulder screwdriver   Arm bars    manual therapy techniques: Joint mobilizations and Soft tissue Mobilization were applied to the: L shoulder for 0 minutes, including:  Pec minor/major release  L GH mobs posterior and inferior  Hypervolt massage gun to upper trap, levator scap, thoracic paraspinal L  SOR  Manual traction          Patient Education and Home Exercises     Home Exercises Provided and Patient " Education Provided     Education provided:   - education on condition  - cancellation policy    Written Home Exercises Provided: yes. Exercises were reviewed and Massimo was able to demonstrate them prior to the end of the session.  Massimo demonstrated good  understanding of the education provided. See EMR under Patient Instructions for exercises provided during therapy sessions    ASSESSMENT     Darío doing well today despite short beak from PT secondary to jury duty. Despite this, he was compliant with home program and has maintained progressed. He continues to present with feelings of oncoming dislocation with end rage shoulder external rotation and internal rotation and with some limitations in shoulder mobility with latissimus tightness present. Provided scheduling sheet for additional 6-12 visits as patient will continue to benefit from strengthening and stabilization.     Massimo Is progressing well towards his goals.   Pt prognosis is Good.     Pt will continue to benefit from skilled outpatient physical therapy to address the deficits listed in the problem list box on initial evaluation, provide pt/family education and to maximize pt's level of independence in the home and community environment.     Pt's spiritual, cultural and educational needs considered and pt agreeable to plan of care and goals.     Anticipated barriers to physical therapy: scheduling    Goals:   Short Term Goals:  4 weeks  1.Report decreased L shoulder pain < / =  4/10  to increase tolerance for daily activity PROGRESSING  2. Increase PROM into ER by 5-10 degrees without passive posterior glide PROGRESSING  3. Increased strength by 1/3 MMT grade in L UE to increase tolerance for ADL and work activities.PROGRESSING  4. Pt to tolerate HEP to improve ROM and independence with ADL's PROGRESSING     Long Term Goals: 8 weeks  1.Report decreased L shoulder pain  < / =  2 /10  to increase tolerance for biking and playing base guitar  PROGRESSING  2.Increase AROM to L =R to perform work activities without difficulty PROGRESSING  3.Increase strength to >/= 4/5 in L UE to increase tolerance for ADL and work activities. PROGRESSING  4. Pt goal: Pt to demonstrate ability to put left arm behind head without feeling of oncoming shoulder dislocation. PROGRESSING  5. Pt will have improved gcode of CJ (20-40% limited) on FOTO shoulder in order to demonstrate true functional improvement. PROGRESSING  6. 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 Pt to report ability to play upright base guitar without increase in pain for regular functioning at his job PROGRESSING      PLAN     Continue PT POC.    Mariama Hassan, PT

## 2022-10-18 ENCOUNTER — CLINICAL SUPPORT (OUTPATIENT)
Dept: REHABILITATION | Facility: HOSPITAL | Age: 37
End: 2022-10-18
Attending: ORTHOPAEDIC SURGERY
Payer: MEDICAID

## 2022-10-18 DIAGNOSIS — M25.612 DECREASED ROM OF LEFT SHOULDER: ICD-10-CM

## 2022-10-18 DIAGNOSIS — M25.312 SHOULDER INSTABILITY, LEFT: Primary | ICD-10-CM

## 2022-10-18 DIAGNOSIS — M62.81 MUSCLE WEAKNESS OF LEFT UPPER EXTREMITY: ICD-10-CM

## 2022-10-18 PROCEDURE — 97110 THERAPEUTIC EXERCISES: CPT | Mod: PO

## 2022-10-18 NOTE — PROGRESS NOTES
[facility]   Physical Therapy Treatment Note    Name: Massimo Chanel  Clinic Number: [id number]    Therapy Diagnosis:   Encounter Diagnoses   Name Primary?    Shoulder instability, left Yes    Muscle weakness of left upper extremity     Decreased ROM of left shoulder          Physician: Liborio Wagoner MD    Visit Date: 10/18/2022    Physician Orders: PT Eval and Treat   Medical Diagnosis from Referral: Shoulder instability, left [M25.312]  Evaluation Date: 7/12/2022  Authorization Period Expiration: 6/15/23  Plan of Care Expiration: 11/12/22  Progress Note Due: 10/22/22  Visit # / Visits authorized: 9/20  FOTO: 3/3 NEEDS DISCHARGE FOTO       Precautions: Standard     PTA Visit #: 0/5     Time In: 1:46  Time Out: 2:26  Total Billable Time: 40 minutes    SUBJECTIVE     Pt reports: shoulder has been doing well but was acting up last night  He was compliant with home exercise program.  Response to previous treatment: Felt fine.    Functional change: ongoing    Pain: 1/10  Location: left shoulder      OBJECTIVE        Passive Range of Motion:  NOT TESTED TODAY  Shoulder Right Left   Flexion  with pain   Abduction  with discomfort   ER at 0 NT NT   ER at 90 NT 90 with relocation; 55 without relocation    IR NT NT      Active Range of Motion:   Shoulder Right Left   Flexion 175 160   Abduction 157 160    ER at 0 95 90   ER at 90 95 63 with mild discomfort   IR at 90 70 70   Reach behind head yes yes   Reach behind back  yes yes      Strength:  Shoulder Right Left   Flexion 5/5 4+/5   Abduction 5/5 4+/5   ER 5/5 3/5   IR 5/5 4+/5         Special Tests:    Right Left   Empty Can Test - +   Drop Arm test - -   Subscaputlaris Lift Off - -   O'leonila's test - + shoulder feels like it will pop out of socket   Hawkin's Kenndy - + feels like shoulder will dislocate   Neer's Test - -   Anterior Apprehension test - NT today   Sulcus Sign - -         Joint Mobility: singificant  "hypomobility in posterior and inferior glides of the L glenohumeral joint compared to R.      Palpation: tenderness to palpation along lat and teres minor/major  High tone in pec minor/major bilaterally      Sensation: NT     Flexibility:   Lat: R NT ; L NT              Pec Minor: R limited ; L limited        Limitation/Restriction for FOTO shoulder Survey     Therapist reviewed FOTO scores for Massimo Chanel on 9/22/2022.   FOTO documents entered into [employer] - see Media section.     Limitation Score: 35%           Treatment     Massimo received the treatments listed below:      Neuromuscular re-education for 2 minutes:  rhythmic stabilization in supine at 90 degrees shoulder flexion with elbow straight  supine lat eccentrics with 4# dowel 15x5"  Prone scap retractions 30x3"    therapeutic exercises to develop strength, endurance, ROM and flexibility for 38 minutes including:    UBE fwd/backwards 6'   Prone row 3 x 10 +3#  Prone row with ER at 90 degrees abduction 3x8  Sidelying shoulder ER 3 x 10 1#  Supine serratus punch 4# 3 x 10  Shoulder rows with emphasis on scapular retraction 13# on pulleys machine 2 x 10  shoulder ER walkouts at 90 deg shoulder flexion with yellow theraband 2x10  shoulder internal rotation at 90/90 with yellow theraband 3x8  Shoulder B ER with yellow theraband at wall 3x8  Serratus slides at wall 2 x 10   Yellow weighted ball on wall circles for shoulder stability side to side, up and down, CC/CCW x20 ea  Prone T, Y 3x8 each thumb up/down on red swiss ball   Shoulder ext GTB 2 x 10    Next visit:   ball rebounds on wall at 90 degrees abduction and elbow flexion  Shoulder screwdriver   Arm bars    manual therapy techniques: Joint mobilizations and Soft tissue Mobilization were applied to the: L shoulder for 0 minutes, including:  Pec minor/major release  L GH mobs posterior and inferior  Hypervolt massage gun to upper trap, levator scap, thoracic paraspinal L  SOR  Manual " traction          Patient Education and Home Exercises     Home Exercises Provided and Patient Education Provided     Education provided:   - education on condition  - cancellation policy    Written Home Exercises Provided: yes. Exercises were reviewed and Massimo was able to demonstrate them prior to the end of the session.  Massimo demonstrated good  understanding of the education provided. See EMR under Patient Instructions for exercises provided during therapy sessions    ASSESSMENT     Darío doing well today despite extended break from PT secondary to work. He is able to return to doing exercises without difficulty today. Will perform formal reassessment at next visit. Continue to progress.     Massimo Is progressing well towards his goals.   Pt prognosis is Good.     Pt will continue to benefit from skilled outpatient physical therapy to address the deficits listed in the problem list box on initial evaluation, provide pt/family education and to maximize pt's level of independence in the home and community environment.     Pt's spiritual, cultural and educational needs considered and pt agreeable to plan of care and goals.     Anticipated barriers to physical therapy: scheduling    Goals:   Short Term Goals:  4 weeks  1.Report decreased L shoulder pain < / =  4/10  to increase tolerance for daily activity PROGRESSING  2. Increase PROM into ER by 5-10 degrees without passive posterior glide PROGRESSING  3. Increased strength by 1/3 MMT grade in L UE to increase tolerance for ADL and work activities.PROGRESSING  4. Pt to tolerate HEP to improve ROM and independence with ADL's PROGRESSING     Long Term Goals: 8 weeks  1.Report decreased L shoulder pain  < / =  2 /10  to increase tolerance for biking and playing base guitar PROGRESSING  2.Increase AROM to L =R to perform work activities without difficulty PROGRESSING  3.Increase strength to >/= 4/5 in L UE to increase tolerance for ADL and work activities.  PROGRESSING  4. Pt goal: Pt to demonstrate ability to put left arm behind head without feeling of oncoming shoulder dislocation. PROGRESSING  5. Pt will have improved gcode of CJ (20-40% limited) on FOTO shoulder in order to demonstrate true functional improvement. PROGRESSING  6. 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 Pt to report ability to play upright base guitar without increase in pain for regular functioning at his job PROGRESSING      PLAN     Continue PT POC.    Mariama Hassan, PT

## 2022-10-20 ENCOUNTER — CLINICAL SUPPORT (OUTPATIENT)
Dept: REHABILITATION | Facility: HOSPITAL | Age: 37
End: 2022-10-20
Attending: ORTHOPAEDIC SURGERY
Payer: MEDICAID

## 2022-10-20 DIAGNOSIS — M62.81 MUSCLE WEAKNESS OF LEFT UPPER EXTREMITY: ICD-10-CM

## 2022-10-20 DIAGNOSIS — M25.312 SHOULDER INSTABILITY, LEFT: Primary | ICD-10-CM

## 2022-10-20 DIAGNOSIS — M25.612 DECREASED ROM OF LEFT SHOULDER: ICD-10-CM

## 2022-10-20 PROCEDURE — 97110 THERAPEUTIC EXERCISES: CPT | Mod: PO

## 2022-10-20 NOTE — PROGRESS NOTES
[facility]   Physical Therapy Treatment Note    Name: Massimo Chanel  Clinic Number: [id number]    Therapy Diagnosis:   Encounter Diagnoses   Name Primary?    Shoulder instability, left Yes    Muscle weakness of left upper extremity     Decreased ROM of left shoulder            Physician: Liborio Wagoner MD    Visit Date: 10/20/2022    Physician Orders: PT Eval and Treat   Medical Diagnosis from Referral: Shoulder instability, left [M25.312]  Evaluation Date: 7/12/2022  Authorization Period Expiration: 6/15/23  Plan of Care Expiration: 11/12/22  Progress Note Due: 10/22/22  Visit # / Visits authorized: 10/20  FOTO: 3/3 NEEDS DISCHARGE FOTO       Precautions: Standard     PTA Visit #: 0/5     Time In: 2:00  Time Out: 2:53  Total Billable Time: 53 minutes    SUBJECTIVE     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   He was compliant with home exercise program.  Response to previous treatment: Felt fine.    Functional change: ongoing    Pain: 1/10  Location: left shoulder      OBJECTIVE        Passive Range of Motion:  NOT TESTED TODAY  Shoulder Right Left   Flexion  with pain   Abduction  with discomfort   ER at 0 NT NT   ER at 90 NT 90 with relocation; 55 without relocation    IR NT NT      Active Range of Motion:   Shoulder Right Left   Flexion 175 150   Abduction 157 168   ER at 0 95 90   ER at 90 95 78   IR at 90 70 70   Reach behind head yes yes   Reach behind back  yes yes      Strength:  Shoulder Right Left   Flexion 5/5 4+/5   Abduction 5/5 4+/5   ER 5/5 4/5   IR 5/5 4+/5         Joint Mobility: singificant hypomobility in posterior and inferior glides of the L glenohumeral joint compared to R.      Palpation: tenderness to palpation along lat and teres minor/major  High tone in pec minor/major bilaterally      Sensation: NT     Flexibility:   Lat: R NT ; L NT              Pec Minor: R limited ; L limited        Limitation/Restriction  "for FOTO shoulder Survey     Therapist reviewed FOTO scores for Massimo Chanel on 10/18/2022.   FOTO documents entered into [employer] - see Media section.     Limitation Score: 34%           Treatment     Massimo received the treatments listed below:      Neuromuscular re-education for 8 minutes:  rhythmic stabilization in supine at 90 degrees shoulder flexion with elbow straight 30x3"  supine lat eccentrics with thumbs out 4# dowel 15x5"  Prone scap retractions 30x3"    therapeutic exercises to develop strength, endurance, ROM and flexibility for 45 minutes including:    UBE fwd/backwards 6'   Prone row 3 x 10 +3#  Prone row with ER at 90 degrees abduction 3x8  Sidelying shoulder ER 3 x 10 1#  Supine serratus punch 4# 3 x 10  Shoulder rows with emphasis on scapular retraction 13# on pulleys machine 2 x 10  shoulder ER walkouts at 90 deg shoulder flexion with yellow theraband 2x10  shoulder internal rotation at 90/90 with yellow theraband 3x8  Shoulder external rotation at 90/90 with yellow theraband 3x8  Shoulder B ER with yellow theraband at wall 3x8  Serratus slides at wall 2 x 10   Yellow weighted ball on wall circles for shoulder stability side to side, up and down, CC/CCW x20 ea  Prone T, Y 3x8 each thumb up/down on red swiss ball   Shoulder ext GTB 2 x 10    Next visit:   ball rebounds on wall at 90 degrees abduction and elbow flexion  Shoulder screwdriver   Arm bars    manual therapy techniques: Joint mobilizations and Soft tissue Mobilization were applied to the: L shoulder for 0 minutes, including:  Pec minor/major release  L GH mobs posterior and inferior  Hypervolt massage gun to upper trap, levator scap, thoracic paraspinal L  SOR  Manual traction          Patient Education and Home Exercises     Home Exercises Provided and Patient Education Provided     Education provided:   - education on condition  - cancellation policy    Written Home Exercises Provided: yes. Exercises were reviewed and Massimo was " able to demonstrate them prior to the end of the session.  Massimo demonstrated good  understanding of the education provided. See EMR under Patient Instructions for exercises provided during therapy sessions    ASSESSMENT     Reassessment reveals subjective improvement in shoulder stability, muscle tone, and reduced pain. Also reveals objective improvements in strength and functional ability. Good tolerance to activity today with no pain to report. Continue to progress.     Massimo Is progressing well towards his goals.   Pt prognosis is Good.     Pt will continue to benefit from skilled outpatient physical therapy to address the deficits listed in the problem list box on initial evaluation, provide pt/family education and to maximize pt's level of independence in the home and community environment.     Pt's spiritual, cultural and educational needs considered and pt agreeable to plan of care and goals.     Anticipated barriers to physical therapy: scheduling    Goals:   Short Term Goals:  4 weeks  1.Report decreased L shoulder pain < / =  4/10  to increase tolerance for daily activity MET  2. Increase PROM into ER by 5-10 degrees without passive posterior glide MET  3. Increased strength by 1/3 MMT grade in L UE to increase tolerance for ADL and work activities. MET  4. Pt to tolerate HEP to improve ROM and independence with ADL's MET     Long Term Goals: 8 weeks  1.Report decreased L shoulder pain  < / =  2 /10  to increase tolerance for biking and playing base guitar PROGRESSING  2.Increase AROM to L =R to perform work activities without difficulty PROGRESSING  3.Increase strength to >/= 4/5 in L UE to increase tolerance for ADL and work activities. PROGRESSING  4. Pt goal: Pt to demonstrate ability to put left arm behind head without feeling of oncoming shoulder dislocation. PROGRESSING  5. Pt will have improved gcode of CJ (20-40% limited) on FOTO shoulder in order to demonstrate true functional improvement.  MET  6. 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 Pt to report ability to play upright base guitar without increase in pain for regular functioning at his job MET      PLAN     Continue PT POC.    Mariama Hassan, PT

## 2022-10-27 ENCOUNTER — CLINICAL SUPPORT (OUTPATIENT)
Dept: REHABILITATION | Facility: HOSPITAL | Age: 37
End: 2022-10-27
Attending: ORTHOPAEDIC SURGERY
Payer: MEDICAID

## 2022-10-27 DIAGNOSIS — M25.312 SHOULDER INSTABILITY, LEFT: Primary | ICD-10-CM

## 2022-10-27 DIAGNOSIS — M62.81 MUSCLE WEAKNESS OF LEFT UPPER EXTREMITY: ICD-10-CM

## 2022-10-27 DIAGNOSIS — M25.612 DECREASED ROM OF LEFT SHOULDER: ICD-10-CM

## 2022-10-27 PROCEDURE — 97110 THERAPEUTIC EXERCISES: CPT | Mod: PO

## 2022-10-27 NOTE — PROGRESS NOTES
"[facility]   Physical Therapy Treatment Note    Name: Massimo Chanel  Clinic Number: [id number]    Therapy Diagnosis:   Encounter Diagnoses   Name Primary?    Shoulder instability, left Yes    Muscle weakness of left upper extremity     Decreased ROM of left shoulder              Physician: Liborio Wagoner MD    Visit Date: 10/27/2022    Physician Orders: PT Eval and Treat   Medical Diagnosis from Referral: Shoulder instability, left [M25.312]  Evaluation Date: 7/12/2022  Authorization Period Expiration: 6/15/23  Plan of Care Expiration: 11/12/22  Progress Note Due: 11/20/22  Visit # / Visits authorized: 10/20  FOTO: 3/3 NEEDS DISCHARGE FOTO       Precautions: Standard     PTA Visit #: 0/5     Time In: 2:02  Time Out: 2:40  Total Billable Time: 38 minutes    SUBJECTIVE     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 Noticeably better than when he started PT.   He was compliant with home exercise program.  Response to previous treatment: Felt fine.    Functional change: ongoing    Pain: 1/10  Location: left shoulder      OBJECTIVE     Objective measures taken at progress report unless specified otherwise.      Treatment     Massimo received the treatments listed below:      Neuromuscular re-education for 0 minutes:  rhythmic stabilization in supine at 90 degrees shoulder flexion with elbow straight 30x3"  supine lat eccentrics with thumbs out 4# dowel 15x5"  Prone scap retractions 30x3"    therapeutic exercises to develop strength, endurance, ROM and flexibility for 38 minutes including:    UBE fwd/backwards 6'   Prone row 3 x 10 +3#  Prone row with ER at 90 degrees abduction 3x8  Sidelying shoulder ER 3 x 10 1#  Supine serratus punch 4# 3 x 10  Shoulder rows with emphasis on scapular retraction 13# on pulleys machine 2 x 10  shoulder ER walkouts at 90 deg shoulder flexion with yellow theraband " 2x10  shoulder internal rotation at 90/90 with yellow theraband 3x8  Shoulder external rotation at 90/90 with yellow theraband 3x8  Shoulder B ER with yellow theraband at wall 3x8  Serratus slides at wall 2 x 10   Yellow weighted ball on wall circles for shoulder stability side to side, up and down, CC/CCW x20 ea  Prone T, Y 3x8 each thumb up/down on red swiss ball   Shoulder ext GTB 2 x 10  +KB walks at 90 degrees flexion 5# x2 laps on turf    Next visit:   ball rebounds on wall at 90 degrees abduction and elbow flexion  Shoulder screwdriver   Arm bars    manual therapy techniques: Joint mobilizations and Soft tissue Mobilization were applied to the: L shoulder for 0 minutes, including:  Pec minor/major release  L GH mobs posterior and inferior  Hypervolt massage gun to upper trap, levator scap, thoracic paraspinal L  SOR  Manual traction          Patient Education and Home Exercises     Home Exercises Provided and Patient Education Provided     Education provided:   - education on condition  - cancellation policy    Written Home Exercises Provided: yes. Exercises were reviewed and Massimo was able to demonstrate them prior to the end of the session.  Massimo demonstrated good  understanding of the education provided. See EMR under Patient Instructions for exercises provided during therapy sessions    ASSESSMENT     Massimo is doing very well today with no pain to report at start of session, only some stiffness. He tolerated all exercises very well including newly added activities. Continue to progress as tolerated with stability activities.     Massimo Is progressing well towards his goals.   Pt prognosis is Good.     Pt will continue to benefit from skilled outpatient physical therapy to address the deficits listed in the problem list box on initial evaluation, provide pt/family education and to maximize pt's level of independence in the home and community environment.     Pt's spiritual, cultural and educational needs  considered and pt agreeable to plan of care and goals.     Anticipated barriers to physical therapy: scheduling    Goals:   Short Term Goals:  4 weeks  1.Report decreased L shoulder pain < / =  4/10  to increase tolerance for daily activity MET  2. Increase PROM into ER by 5-10 degrees without passive posterior glide MET  3. Increased strength by 1/3 MMT grade in L UE to increase tolerance for ADL and work activities. MET  4. Pt to tolerate HEP to improve ROM and independence with ADL's MET     Long Term Goals: 8 weeks  1.Report decreased L shoulder pain  < / =  2 /10  to increase tolerance for biking and playing base guitar PROGRESSING  2.Increase AROM to L =R to perform work activities without difficulty PROGRESSING  3.Increase strength to >/= 4/5 in L UE to increase tolerance for ADL and work activities. PROGRESSING  4. Pt goal: Pt to demonstrate ability to put left arm behind head without feeling of oncoming shoulder dislocation. PROGRESSING  5. Pt will have improved gcode of CJ (20-40% limited) on FOTO shoulder in order to demonstrate true functional improvement. MET  6. 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 Pt to report ability to play upright base guitar without increase in pain for regular functioning at his job MET      PLAN     Continue PT POC.    Mariama Hassan, PT

## 2022-11-03 ENCOUNTER — CLINICAL SUPPORT (OUTPATIENT)
Dept: REHABILITATION | Facility: HOSPITAL | Age: 37
End: 2022-11-03
Payer: MEDICAID

## 2022-11-03 DIAGNOSIS — M62.81 MUSCLE WEAKNESS OF LEFT UPPER EXTREMITY: ICD-10-CM

## 2022-11-03 DIAGNOSIS — M25.312 SHOULDER INSTABILITY, LEFT: Primary | ICD-10-CM

## 2022-11-03 DIAGNOSIS — M25.612 DECREASED ROM OF LEFT SHOULDER: ICD-10-CM

## 2022-11-03 PROCEDURE — 97110 THERAPEUTIC EXERCISES: CPT | Mod: PO

## 2022-11-03 NOTE — PROGRESS NOTES
"[facility]   Physical Therapy Treatment Note    Name: Massimo Chanel  Clinic Number: [id number]    Therapy Diagnosis:   Encounter Diagnoses   Name Primary?    Shoulder instability, left Yes    Muscle weakness of left upper extremity     Decreased ROM of left shoulder          Physician: Liborio Wagoner MD    Visit Date: 11/3/2022    Physician Orders: PT Eval and Treat   Medical Diagnosis from Referral: Shoulder instability, left [M25.312]  Evaluation Date: 7/12/2022  Authorization Period Expiration: 12/31/22  Plan of Care Expiration: 11/12/22  Progress Note Due: 11/20/22  Visit # / Visits authorized: 12/20  FOTO: 3/3 NEEDS DISCHARGE FOTO       Precautions: Standard     PTA Visit #: 0/5     Time In: 2:05  Time Out: 2:45  Total Billable Time: 40 minutes    SUBJECTIVE     Pt reports: no difficulty after last session despite new exercises added.   He was compliant with home exercise program.  Response to previous treatment: Felt fine.  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      Treatment     Massimo received the treatments listed below:      Neuromuscular re-education for 0 minutes:  rhythmic stabilization in supine at 90 degrees shoulder flexion with elbow straight 30x3"  supine lat eccentrics with thumbs out 4# dowel 15x5"  Prone scap retractions 30x3"    therapeutic exercises to develop strength, endurance, ROM and flexibility for 40 minutes including:    UBE fwd/backwards 6'   Prone row 3 x 10 +3#  Prone row with ER at 90 degrees abduction 3x8 with 1# dumbbell  Sidelying shoulder ER 3 x 10 1#  Supine serratus punch 4# 3 x 10  Shoulder rows with emphasis on scapular retraction 13# on pulleys machine 2 x 10  shoulder ER walkouts at 90 deg shoulder flexion with yellow theraband " 2x10  shoulder internal rotation at 90/90 with yellow theraband 3x8  Shoulder external rotation at 90/90 with yellow theraband 3x8  Shoulder B ER with yellow theraband at wall 3x8  Serratus slides at wall with foam roll 2 x 10   Yellow weighted ball on wall circles for shoulder stability side to side, up and down, CC/CCW x20 ea  Prone T, Y 3x8 each thumb up/down on red swiss ball   Shoulder ext GTB 2 x 10  KB walks at 90 degrees flexion 5# x2 laps on turf  +banded bilateral uppercuts with yellow theraband 2x10      Next visit:   ball rebounds on wall at 90 degrees abduction and elbow flexion  Shoulder screwdriver   Arm bars  Ball rebounds in 1/2 kneel at [facility]    manual therapy techniques: Joint mobilizations and Soft tissue Mobilization were applied to the: L shoulder for 0 minutes, including:  Pec minor/major release  L GH mobs posterior and inferior  Hypervolt massage gun to upper trap, levator scap, thoracic paraspinal L  SOR  Manual traction          Patient Education and Home Exercises     Home Exercises Provided and Patient Education Provided     Education provided:   - education on condition  - cancellation policy    Written Home Exercises Provided: yes. Exercises were reviewed and Massimo was able to demonstrate them prior to the end of the session.  Massimo demonstrated good  understanding of the education provided. See EMR under Patient Instructions for exercises provided during therapy sessions    ASSESSMENT     Continuing to progress shoulder stabilization activities with good response from patient. He continues to be challenged without increase in pain or discomfort and will benefit from continued periscapular and rotator cuff strengthening.     Massimo Is progressing well towards his goals.   Pt prognosis is Good.     Pt will continue to benefit from skilled outpatient physical therapy to address the deficits listed in the problem list box on initial evaluation, provide pt/family education and to  maximize pt's level of independence in the home and community environment.     Pt's spiritual, cultural and educational needs considered and pt agreeable to plan of care and goals.     Anticipated barriers to physical therapy: scheduling    Goals:   Short Term Goals:  4 weeks  1.Report decreased L shoulder pain < / =  4/10  to increase tolerance for daily activity MET  2. Increase PROM into ER by 5-10 degrees without passive posterior glide MET  3. Increased strength by 1/3 MMT grade in L UE to increase tolerance for ADL and work activities. MET  4. Pt to tolerate HEP to improve ROM and independence with ADL's MET     Long Term Goals: 8 weeks  1.Report decreased L shoulder pain  < / =  2 /10  to increase tolerance for biking and playing base guitar PROGRESSING  2.Increase AROM to L =R to perform work activities without difficulty PROGRESSING  3.Increase strength to >/= 4/5 in L UE to increase tolerance for ADL and work activities. PROGRESSING  4. Pt goal: Pt to demonstrate ability to put left arm behind head without feeling of oncoming shoulder dislocation. PROGRESSING  5. Pt will have improved gcode of CJ (20-40% limited) on FOTO shoulder in order to demonstrate true functional improvement. MET  6. 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 Pt to report ability to play upright base guitar without increase in pain for regular functioning at his job MET      PLAN     Continue PT POC.    Mariama Hassan, PT

## 2022-11-15 ENCOUNTER — CLINICAL SUPPORT (OUTPATIENT)
Dept: REHABILITATION | Facility: HOSPITAL | Age: 37
End: 2022-11-15
Payer: MEDICAID

## 2022-11-15 DIAGNOSIS — M25.612 DECREASED ROM OF LEFT SHOULDER: ICD-10-CM

## 2022-11-15 DIAGNOSIS — M25.312 SHOULDER INSTABILITY, LEFT: Primary | ICD-10-CM

## 2022-11-15 DIAGNOSIS — M62.81 MUSCLE WEAKNESS OF LEFT UPPER EXTREMITY: ICD-10-CM

## 2022-11-15 PROCEDURE — 97110 THERAPEUTIC EXERCISES: CPT | Mod: PO

## 2022-11-15 NOTE — PROGRESS NOTES
"[facility]   Physical Therapy Treatment Note    Name: Massimo Chanel  Clinic Number: [id number]    Therapy Diagnosis:   Encounter Diagnoses   Name Primary?    Shoulder instability, left Yes    Muscle weakness of left upper extremity     Decreased ROM of left shoulder            Physician: Liborio Wagoner MD    Visit Date: 11/15/2022    Physician Orders: PT Eval and Treat   Medical Diagnosis from Referral: Shoulder instability, left [M25.312]  Evaluation Date: 7/12/2022  Authorization Period Expiration: 12/31/22  Plan of Care Expiration: 11/12/22  Progress Note Due: 11/20/22  Visit # / Visits authorized: 13/20  FOTO: 3/3 NEEDS DISCHARGE FOTO       Precautions: Standard     PTA Visit #: 0/5     Time In: 1:47  Time Out: 2:24  Total Billable Time: 39 minutes    SUBJECTIVE     Pt reports: shoulder is doing really well  He was compliant with home exercise program.  Response to previous treatment: Felt fine.  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      Treatment     Massimo received the treatments listed below:      Neuromuscular re-education for 0 minutes:  rhythmic stabilization in supine at 90 degrees shoulder flexion with elbow straight 30x3"  supine lat eccentrics with thumbs out 4# dowel 15x5"  Prone scap retractions 30x3"    therapeutic exercises to develop strength, endurance, ROM and flexibility for 39 minutes including:    UBE fwd/backwards 6'   Prone row 3 x 10 +3#  Prone row with ER at 90 degrees abduction 3x8 with 2# dumbbell  Sidelying shoulder ER 3 x 10 1#  Supine serratus punch 4# 3 x 10  Shoulder rows with emphasis on scapular retraction 13# on pulleys machine 2 x 10  shoulder ER walkouts at 90 deg shoulder flexion with yellow theraband 2x10  shoulder internal " rotation at 90/90 with yellow theraband 3x8  Shoulder external rotation at 90/90 with yellow theraband 3x8  Shoulder B ER with yellow theraband at wall 3x8  Serratus slides at wall with foam roll 2 x 10   Yellow weighted ball on wall circles for shoulder stability side to side, up and down, CC/CCW x20 ea  Prone T, Y 3x8 each thumb up/down on red swiss ball   Shoulder ext GTB 2 x 10  KB walks at 90 degrees flexion 5# x2 laps on turf  banded bilateral uppercuts with yellow theraband 2x10  +Ball rebounds (red) in 1/2 kneel at trampoline L 3x10  +Standing high row with external rotation at freemotion 3# L 2x8   +TRX low oliver 3x8   +TRX push up 2x8      Next visit:   ball rebounds on wall at 90 degrees abduction and elbow flexion  Shoulder screwdriver   Arm bars      manual therapy techniques: Joint mobilizations and Soft tissue Mobilization were applied to the: L shoulder for 0 minutes, including:  Pec minor/major release  L GH mobs posterior and inferior  Hypervolt massage gun to upper trap, levator scap, thoracic paraspinal L  SOR  Manual traction          Patient Education and Home Exercises     Home Exercises Provided and Patient Education Provided     Education provided:   - education on condition  - cancellation policy    Written Home Exercises Provided: yes. Exercises were reviewed and Massimo was able to demonstrate them prior to the end of the session.  Massimo demonstrated good  understanding of the education provided. See EMR under Patient Instructions for exercises provided during therapy sessions    ASSESSMENT     Massimo with good response to therapy session today including newly added activities. Will review exercises next visit and discharge to home program.     Massimo Is progressing well towards his goals.   Pt prognosis is Good.     Pt will continue to benefit from skilled outpatient physical therapy to address the deficits listed in the problem list box on initial evaluation, provide pt/family education and  to maximize pt's level of independence in the home and community environment.     Pt's spiritual, cultural and educational needs considered and pt agreeable to plan of care and goals.     Anticipated barriers to physical therapy: scheduling    Goals:   Short Term Goals:  4 weeks  1.Report decreased L shoulder pain < / =  4/10  to increase tolerance for daily activity MET  2. Increase PROM into ER by 5-10 degrees without passive posterior glide MET  3. Increased strength by 1/3 MMT grade in L UE to increase tolerance for ADL and work activities. MET  4. Pt to tolerate HEP to improve ROM and independence with ADL's MET     Long Term Goals: 8 weeks  1.Report decreased L shoulder pain  < / =  2 /10  to increase tolerance for biking and playing base guitar PROGRESSING  2.Increase AROM to L =R to perform work activities without difficulty PROGRESSING  3.Increase strength to >/= 4/5 in L UE to increase tolerance for ADL and work activities. PROGRESSING  4. Pt goal: Pt to demonstrate ability to put left arm behind head without feeling of oncoming shoulder dislocation. PROGRESSING  5. Pt will have improved gcode of CJ (20-40% limited) on FOTO shoulder in order to demonstrate true functional improvement. MET  6. 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 Pt to report ability to play upright base guitar without increase in pain for regular functioning at his job MET      PLAN     Continue PT POC.    Mariama Hassan, PT

## 2022-11-17 ENCOUNTER — CLINICAL SUPPORT (OUTPATIENT)
Dept: REHABILITATION | Facility: HOSPITAL | Age: 37
End: 2022-11-17
Payer: MEDICAID

## 2022-11-17 DIAGNOSIS — M62.81 MUSCLE WEAKNESS OF LEFT UPPER EXTREMITY: ICD-10-CM

## 2022-11-17 DIAGNOSIS — M25.612 DECREASED ROM OF LEFT SHOULDER: ICD-10-CM

## 2022-11-17 DIAGNOSIS — M25.312 SHOULDER INSTABILITY, LEFT: Primary | ICD-10-CM

## 2022-11-17 PROCEDURE — 97110 THERAPEUTIC EXERCISES: CPT | Mod: PO

## 2022-11-17 NOTE — PROGRESS NOTES
"[facility]   Physical Therapy Treatment Note / Discharge Visit    Name: Massimo Chanel  Clinic Number: [id number]    Therapy Diagnosis:   Encounter Diagnoses   Name Primary?    Shoulder instability, left Yes    Muscle weakness of left upper extremity     Decreased ROM of left shoulder          Physician: Liborio Wagoner MD    Visit Date: 11/17/2022    Physician Orders: PT Eval and Treat   Medical Diagnosis from Referral: Shoulder instability, left [M25.312]  Evaluation Date: 7/12/2022  Authorization Period Expiration: 12/31/22  Plan of Care Expiration: 11/12/22  Progress Note Due: 11/20/22  Visit # / Visits authorized: 14/20  FOTO: 4/3        Precautions: Standard     PTA Visit #: 0/5     Time In: 2:04  Time Out: 2:25  Total Billable Time: 41 minutes    SUBJECTIVE     Pt reports: shoulder is doing really well  He was compliant with home exercise program.  Response to previous treatment: Felt fine.  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      Treatment     Massimo received the treatments listed below:      Neuromuscular re-education for 0 minutes:  rhythmic stabilization in supine at 90 degrees shoulder flexion with elbow straight 30x3"  supine lat eccentrics with thumbs out 4# dowel 15x5"  Prone scap retractions 30x3"    therapeutic exercises to develop strength, endurance, ROM and flexibility for 41 minutes including:    UBE fwd/backwards 6'   Prone row 3 x 10 +4#  Prone row with ER at 90 degrees abduction 3x8 with 4# dumbbell  Sidelying shoulder ER 3 x 10 1#  Supine serratus punch 4# 3 x 10  Shoulder rows with emphasis on scapular retraction 13# on pulleys machine 2 x 10  shoulder ER walkouts at 90 deg shoulder flexion with yellow theraband 2x10  shoulder internal rotation " at 90/90 with red theraband 3x8  Shoulder external rotation at 90/90 with yellow theraband 3x8  Shoulder B ER with yellow theraband at wall 3x8  Serratus slides at wall with foam roll 2 x 10   Yellow weighted ball on wall circles for shoulder stability side to side, up and down, CC/CCW x20 ea  Prone T, Y 3x8 each thumb up/down on red swiss ball   Shoulder ext GTB 2 x 10  KB walks at 90 degrees flexion 5# x2 laps on turf  banded bilateral uppercuts with yellow theraband 2x10   Ball rebounds (red) in 1/2 kneel at trampoline L 3x10  Standing high row with external rotation at freemotion 3# L 2x8   TRX low oliver 3x8   TRX push up 2x8      Next visit:   ball rebounds on wall at 90 degrees abduction and elbow flexion  Shoulder screwdriver   Arm bars      manual therapy techniques: Joint mobilizations and Soft tissue Mobilization were applied to the: L shoulder for 0 minutes, including:  Pec minor/major release  L GH mobs posterior and inferior  Hypervolt massage gun to upper trap, levator scap, thoracic paraspinal L  SOR  Manual traction          Patient Education and Home Exercises     Home Exercises Provided and Patient Education Provided     Education provided:   - education on condition  - cancellation policy    Written Home Exercises Provided: yes. Exercises were reviewed and Massimo was able to demonstrate them prior to the end of the session.  Massimo demonstrated good  understanding of the education provided. See EMR under Patient Instructions for exercises provided during therapy sessions    ASSESSMENT     Massimo with good response to therapy session today. We reviewed all exercises on his home program and provided him with appropriate therabands. He demonstrates good understanding and is able to complete pain -free. There has been significant improvement in range of motion and upper extremity function. He is ready for discharge to home program today.     Massimo Is progressing well towards his goals.   Pt prognosis is  Good.     Pt will continue to benefit from skilled outpatient physical therapy to address the deficits listed in the problem list box on initial evaluation, provide pt/family education and to maximize pt's level of independence in the home and community environment.     Pt's spiritual, cultural and educational needs considered and pt agreeable to plan of care and goals.     Anticipated barriers to physical therapy: scheduling    Goals:   Short Term Goals:  4 weeks  1.Report decreased L shoulder pain < / =  4/10  to increase tolerance for daily activity MET  2. Increase PROM into ER by 5-10 degrees without passive posterior glide MET  3. Increased strength by 1/3 MMT grade in L UE to increase tolerance for ADL and work activities. MET  4. Pt to tolerate HEP to improve ROM and independence with ADL's MET     Long Term Goals: 8 weeks  1.Report decreased L shoulder pain  < / =  2 /10  to increase tolerance for biking and playing base guitar MET  2.Increase AROM to L =R to perform work activities without difficulty MET  3.Increase strength to >/= 4/5 in L UE to increase tolerance for ADL and work activities. Not tested today  4. Pt goal: Pt to demonstrate ability to put left arm behind head without feeling of oncoming shoulder dislocation. MET  5. Pt will have improved gcode of CJ (20-40% limited) on FOTO shoulder in order to demonstrate true functional improvement. MET  6. 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 Pt to report ability to play upright base guitar without increase in pain for regular functioning at his job MET      PLAN     Pt is discharged from physical therapy services today.     Mariama Hassan, PT

## 2024-09-13 ENCOUNTER — OCCUPATIONAL HEALTH (OUTPATIENT)
Dept: URGENT CARE | Facility: CLINIC | Age: 39
End: 2024-09-13

## 2024-09-13 DIAGNOSIS — Z00.00 ENCOUNTER FOR PHYSICAL EXAMINATION: Primary | ICD-10-CM
